# Patient Record
Sex: FEMALE | Race: AMERICAN INDIAN OR ALASKA NATIVE | NOT HISPANIC OR LATINO | Employment: PART TIME | URBAN - NONMETROPOLITAN AREA
[De-identification: names, ages, dates, MRNs, and addresses within clinical notes are randomized per-mention and may not be internally consistent; named-entity substitution may affect disease eponyms.]

---

## 2018-04-20 ENCOUNTER — HOSPITAL ENCOUNTER (EMERGENCY)
Facility: OTHER | Age: 28
Discharge: HOME | End: 2018-04-21
Attending: FAMILY MEDICINE | Admitting: FAMILY MEDICINE
Payer: COMMERCIAL

## 2018-04-20 ENCOUNTER — TRANSFERRED RECORDS (OUTPATIENT)
Dept: HEALTH INFORMATION MANAGEMENT | Facility: OTHER | Age: 28
End: 2018-04-20

## 2018-04-20 ENCOUNTER — APPOINTMENT (OUTPATIENT)
Dept: ULTRASOUND IMAGING | Facility: OTHER | Age: 28
End: 2018-04-20
Attending: FAMILY MEDICINE
Payer: COMMERCIAL

## 2018-04-20 DIAGNOSIS — Z34.92: Primary | ICD-10-CM

## 2018-04-20 PROCEDURE — 99284 EMERGENCY DEPT VISIT MOD MDM: CPT | Mod: 25 | Performed by: FAMILY MEDICINE

## 2018-04-20 PROCEDURE — 96360 HYDRATION IV INFUSION INIT: CPT | Performed by: FAMILY MEDICINE

## 2018-04-20 PROCEDURE — 76805 OB US >/= 14 WKS SNGL FETUS: CPT | Mod: TC

## 2018-04-20 PROCEDURE — 25000128 H RX IP 250 OP 636: Performed by: FAMILY MEDICINE

## 2018-04-20 PROCEDURE — 84702 CHORIONIC GONADOTROPIN TEST: CPT | Performed by: FAMILY MEDICINE

## 2018-04-20 PROCEDURE — 80053 COMPREHEN METABOLIC PANEL: CPT | Performed by: FAMILY MEDICINE

## 2018-04-20 PROCEDURE — 85025 COMPLETE CBC W/AUTO DIFF WBC: CPT | Performed by: FAMILY MEDICINE

## 2018-04-20 PROCEDURE — 36415 COLL VENOUS BLD VENIPUNCTURE: CPT | Performed by: FAMILY MEDICINE

## 2018-04-20 PROCEDURE — 99283 EMERGENCY DEPT VISIT LOW MDM: CPT | Mod: Z6 | Performed by: FAMILY MEDICINE

## 2018-04-20 PROCEDURE — 96361 HYDRATE IV INFUSION ADD-ON: CPT | Performed by: FAMILY MEDICINE

## 2018-04-20 RX ORDER — CYCLOBENZAPRINE HCL 10 MG
10 TABLET ORAL
COMMUNITY
Start: 2015-03-30 | End: 2018-04-20

## 2018-04-20 RX ORDER — SODIUM CHLORIDE 9 MG/ML
1000 INJECTION, SOLUTION INTRAVENOUS CONTINUOUS
Status: DISCONTINUED | OUTPATIENT
Start: 2018-04-20 | End: 2018-04-21 | Stop reason: HOSPADM

## 2018-04-20 RX ORDER — PNV NO.95/FERROUS FUM/FOLIC AC 28MG-0.8MG
TABLET ORAL
COMMUNITY
Start: 2018-02-07 | End: 2019-01-04

## 2018-04-20 RX ADMIN — SODIUM CHLORIDE 1000 ML: 900 INJECTION, SOLUTION INTRAVENOUS at 23:05

## 2018-04-20 NOTE — ED AVS SNAPSHOT
[facility]    [address]    [place] [address]    Phone:  [phone]    Fax:  [phone]                                       Michela He   MRN: [id number]    Department:  [facility]   Date of Visit:  4/20/2018           After Visit Summary Signature Page     I have received my discharge instructions, and my questions have been answered. I have discussed any challenges I see with this plan with the nurse or doctor.    ..........................................................................................................................................  Patient/Patient Representative Signature      ..........................................................................................................................................  Patient Representative Print Name and Relationship to Patient    ..................................................               ................................................  Date                                            Time    ..........................................................................................................................................  Reviewed by Signature/Title    ...................................................              ..............................................  Date                                                            Time

## 2018-04-20 NOTE — ED AVS SNAPSHOT
[facility]    [address]    [place] [address]    Phone:  [phone]    Fax:  [phone]                                       Michela eH   MRN: [id number]    Department:  [facility]   Date of Visit:  4/20/2018           Patient Information     Date Of Birth          1990        Your diagnoses for this visit were:     Viable pregnancy in second trimester        You were seen by Fabián Hernández MD.      24 Hour Appointment Hotline       To make an appointment at any [facility], call [phone] ([phone]). If you don't have a family doctor or clinic, we will help you find one. [facility] clinics are conveniently located to serve the needs of you and your family.             Review of your medicines      Our records show that you are taking the medicines listed below. If these are incorrect, please call your family doctor or clinic.        Dose / Directions Last dose taken    Prenatal Vitamin 27-0.8 MG Tabs        Refills:  0        TYLENOL 325 MG Caps   Dose:  650 mg   Generic drug:  Acetaminophen        Take 650 mg by mouth   Refills:  0                Procedures and tests performed during your visit     CBC with platelets differential    Comprehensive metabolic panel    HCG quantitative pregnancy    US OB > 14 Weeks Complete Single      Orders Needing Specimen Collection     None      Pending Results     Date and Time Order Name Status Description    4/20/2018 2300 HCG quantitative pregnancy In process     4/20/2018 2300 Comprehensive metabolic panel In process             Pending Culture Results     No orders found for last 3 day(s).            Pending Results Instructions     If you had any lab results that were not finalized at the time of your Discharge, you can call the ED Lab Result RN at [phone]. You will be contacted by this team for any positive Lab results or changes in treatment. The nurses are available 7 days a  "week from 10A to 6:30P.  You can leave a message 24 hours per day and they will return your call.        Thank you for choosing [facility]       Thank you for choosing [facility] for your care. Our goal is always to provide you with excellent care. Hearing back from our patients is one way we can continue to improve our services. Please take a few minutes to complete the written survey that you may receive in the mail after you visit with us. Thank you!        powervaultharAppSheet Information     AppSheet lets you send messages to your doctor, view your test results, renew your prescriptions, schedule appointments and more. To sign up, go to [email or web address] . Click on \"Log in\" on the left side of the screen, which will take you to the Welcome page. Then click on \"Sign up Now\" on the right side of the page.     You will be asked to enter the access code listed below, as well as some personal information. Please follow the directions to create your username and password.     Your access code is: [email or web address]  Expires: 2018  1:04 AM     Your access code will  in 90 days. If you need help or a new code, please call your [facility] clinic or [phone].        Care EveryWhere ID     This is your Care EveryWhere ID. This could be used by other organizations to access your [facility] medical records  [id number]        Equal Access to Services     PATO RUIZ : Hadii katey Go, waaxda luqadaha, qaybta kaalmada nazia, ivan olguin. So [facility] [phone].    ATENCIÓN: Si habla español, tiene a evangelista disposición servicios gratuitos de asistencia lingüística. Llame al [phone].    We comply with applicable federal civil rights laws and [place] laws. We do not discriminate on the basis of race, color, national origin, age, disability, sex, sexual orientation, or gender identity.            After Visit Summary       This is your record. Keep this with you and show to your " community pharmacist(s) and doctor(s) at your next visit.

## 2018-04-21 VITALS
OXYGEN SATURATION: 98 % | DIASTOLIC BLOOD PRESSURE: 59 MMHG | HEIGHT: 67 IN | TEMPERATURE: 98.8 F | SYSTOLIC BLOOD PRESSURE: 101 MMHG | RESPIRATION RATE: 16 BRPM | HEART RATE: 102 BPM | BODY MASS INDEX: 25.11 KG/M2 | WEIGHT: 160 LBS

## 2018-04-21 LAB
ALBUMIN SERPL-MCNC: 3.5 G/DL (ref 3.5–5.7)
ALP SERPL-CCNC: 54 U/L (ref 34–104)
ALT SERPL W P-5'-P-CCNC: 13 U/L (ref 7–52)
ANION GAP SERPL CALCULATED.3IONS-SCNC: 8 MMOL/L (ref 3–14)
AST SERPL W P-5'-P-CCNC: 13 U/L (ref 13–39)
B-HCG SERPL-ACNC: NORMAL IU/L
BASOPHILS # BLD AUTO: 0 10E9/L (ref 0–0.2)
BASOPHILS NFR BLD AUTO: 0.2 %
BILIRUB SERPL-MCNC: 0.2 MG/DL (ref 0.3–1)
BUN SERPL-MCNC: 6 MG/DL (ref 7–25)
CALCIUM SERPL-MCNC: 8.4 MG/DL (ref 8.6–10.3)
CHLORIDE SERPL-SCNC: 106 MMOL/L (ref 98–107)
CO2 SERPL-SCNC: 21 MMOL/L (ref 21–31)
CREAT SERPL-MCNC: 0.39 MG/DL (ref 0.6–1.2)
DIFFERENTIAL METHOD BLD: NORMAL
EOSINOPHIL # BLD AUTO: 0 10E9/L (ref 0–0.7)
EOSINOPHIL NFR BLD AUTO: 0.2 %
ERYTHROCYTE [DISTWIDTH] IN BLOOD BY AUTOMATED COUNT: 14 % (ref 10–15)
GFR SERPL CREATININE-BSD FRML MDRD: >90 ML/MIN/1.7M2
GLUCOSE SERPL-MCNC: 99 MG/DL (ref 70–105)
HCT VFR BLD AUTO: 39.1 % (ref 35–47)
HGB BLD-MCNC: 13.4 G/DL (ref 11.7–15.7)
IMM GRANULOCYTES # BLD: 0 10E9/L (ref 0–0.4)
IMM GRANULOCYTES NFR BLD: 0.3 %
LYMPHOCYTES # BLD AUTO: 1.3 10E9/L (ref 0.8–5.3)
LYMPHOCYTES NFR BLD AUTO: 13.8 %
MCH RBC QN AUTO: 29.8 PG (ref 26.5–33)
MCHC RBC AUTO-ENTMCNC: 34.3 G/DL (ref 31.5–36.5)
MCV RBC AUTO: 87 FL (ref 78–100)
MONOCYTES # BLD AUTO: 0.6 10E9/L (ref 0–1.3)
MONOCYTES NFR BLD AUTO: 6.1 %
NEUTROPHILS # BLD AUTO: 7.7 10E9/L (ref 1.6–8.3)
NEUTROPHILS NFR BLD AUTO: 79.4 %
PLATELET # BLD AUTO: 170 10E9/L (ref 150–450)
POTASSIUM SERPL-SCNC: 3.3 MMOL/L (ref 3.5–5.1)
PROT SERPL-MCNC: 6.2 G/DL (ref 6.4–8.9)
RBC # BLD AUTO: 4.49 10E12/L (ref 3.8–5.2)
SODIUM SERPL-SCNC: 135 MMOL/L (ref 134–144)
WBC # BLD AUTO: 9.7 10E9/L (ref 4–11)

## 2018-04-21 PROCEDURE — 25000128 H RX IP 250 OP 636: Performed by: FAMILY MEDICINE

## 2018-04-21 RX ADMIN — SODIUM CHLORIDE 1000 ML: 900 INJECTION, SOLUTION INTRAVENOUS at 00:17

## 2018-04-21 NOTE — ED PROVIDER NOTES
"  History   No chief complaint on file.        Pt admit to [facility] via [facility] ambulance.  Pt was to have birth control implants placed (in January) and found out she's pregnant.  She hasn't had an U/S yet and isn't sure of her due date, isn't sure when her last period was (\"around Claire time I think\")   Pt reports cramping, vomiting, pain that radiates all the way around to her back, has some vaginal discharge (has a hx of bacterial infections and thinks she may be getting one again), had a sudden onset of cramping that started at 2115 today, constipation X 4 days but had a \"few\" BM's today.  [facility] unable to find fetal heart tones with doppler.  Pt brought to [facility] from [facility] for U/S.                HPI  Michela He is a 28 year old female who resents the emergency department from [facility].  I discussed with the ER doctor there he felt she needed an ultrasound to rule out ectopic.  She has no vaginal bleeding.  She has unclear dates of her pregnancy.  I agreed she likely needed an ultrasound.  She was transported from [facility] to [facility].    Problem List:    There are no active problems to display for this patient.       Past Medical History:    Past Medical History:   Diagnosis Date     Benign neoplasm of cerebral meninges (H)      Irregular menstrual cycle      LGSIL Pap smear of vagina        Past Surgical History:    Past Surgical History:   Procedure Laterality Date     EXTRACTION(S) DENTAL      No Comments Provided     OTHER SURGICAL HISTORY      8/2011,95592,NERVOUS SYSTEM SURGERY,Removal of meningioma       Family History:    Family History   Problem Relation Age of Onset     Hypertension Mother      Hypertension     DIABETES Other      Diabetes,FH     HEART DISEASE Other      Heart Disease,FH       Social History:  Marital Status:  Single [1]  Social History   Substance Use Topics     Smoking status: Current Every Day Smoker     Packs/day: 0.25     Types: " "Cigarettes     Start date: 1/1/2007     Smokeless tobacco: Never Used     Alcohol use No        Medications:      Acetaminophen (TYLENOL) 325 MG CAPS   Prenatal Vit-Fe Fumarate-FA (PRENATAL VITAMIN) 27-0.8 MG TABS         Review of Systems   Constitutional: Negative for chills and fever.   HENT: Negative for congestion.    Eyes: Negative for photophobia.   Respiratory: Negative for choking and stridor.    Cardiovascular: Negative for chest pain.   Endocrine: Negative for polydipsia, polyphagia and polyuria.   Genitourinary: Negative for dysuria.   Neurological: Negative for seizures and speech difficulty.       Physical Exam   BP: (!) 124/94  Pulse: 102  Temp: 98.8  F (37.1  C)  Resp: 16  Height: 170.2 cm (5' 7\")  Weight: 72.6 kg (160 lb)  SpO2: 96 %      Physical Exam   Cardiovascular: Normal rate.    No murmur heard.  Pulmonary/Chest: Effort normal and breath sounds normal. No respiratory distress. She has no wheezes. She has no rales.   Abdominal: Soft. Bowel sounds are normal.   Nursing note and vitals reviewed.      ED Course     ED Course     Procedures             Results for orders placed or performed during the hospital encounter of 04/20/18 (from the past 24 hour(s))   US OB > 14 Weeks Complete Single    Narrative    EXAM:  US Pregnant Uterus, Limited    CLINICAL HISTORY:  28 years old, female; Signs and symptoms; Lmp or gestational   age (in weeks): Unknown lmp; Antepartum complications; Other: Abd pain,   vomiting; Pregnant; Additional info: R/O ectopic and torsion    TECHNIQUE:  Real-time ultrasound of the pregnant maternal uterus (limited) with   image documentation.    COMPARISON:  No relevant prior studies available.    FINDINGS:    Fetus:  Single viable uterine pregnancy.    Gestational age:  Estimated gestational age by ultrasound is 16 weeks 1 day.    EFW:  Estimated fetal weight is 144 g.    Position:  Previous anterior wall position.    Heart rate:  Cardiac activity is seen with a rate of 150 " beats per minute.    Placenta:  Placenta is low lying, 1.2 cm from the cervical os.    Amniotic fluid:  MALLORY is 13.5 cm which is normal.    Cervix:  Cervix is 3.1 cm in length and closed.    Adnexa:  Corpus luteum cyst left ovary.      Impression    IMPRESSION:         Single viable uterine pregnancy.  No acute findings.    THIS DOCUMENT HAS BEEN ELECTRONICALLY SIGNED BY HELADIO JARA MD       Medications   0.9% sodium chloride BOLUS (1,000 mLs Intravenous New Bag 4/20/18 7249)     Followed by   sodium chloride 0.9% infusion (1,000 mLs Intravenous New Bag 4/21/18 0017)       Assessments & Plan (with Medical Decision Making)       New Prescriptions    No medications on file       Final diagnoses:   Viable pregnancy in second trimester     Patient stable and fine here in the emergency department.  Viable pregnancy on ultrasound, recommend establish early OB care.  Return to the ER with vaginal bleeding nausea vomiting or diarrhea, or fevers.  Patient verbalized understanding of plan of care and is in agreement with plan.  4/20/2018   [facility]     Fabián Hernández MD  04/25/18 0990

## 2018-04-21 NOTE — ED TRIAGE NOTES
"Pt admit to [facility] via [facility] ambulance.  Pt was to have birth control implants placed (in January) and found out she's pregnant.  She hasn't had an U/S yet and isn't sure of her due date, isn't sure when her last period was (\"around Claire time I think\")   Pt reports cramping, vomiting, pain that radiates all the way around to her back, has some vaginal discharge (has a hx of bacterial infections and thinks she may be getting one again), had a sudden onset of cramping that started at 2115 today, constipation X 4 days but had a \"few\" BM's today.  [place] [facility] unable to find fetal heart tones with doppler.  Pt brought to [facility] from [facility] for U/S.    "

## 2018-04-25 ASSESSMENT — ENCOUNTER SYMPTOMS
SPEECH DIFFICULTY: 0
STRIDOR: 0
DYSURIA: 0
FEVER: 0
CHILLS: 0
POLYPHAGIA: 0
SEIZURES: 0
POLYDIPSIA: 0
CHOKING: 0
PHOTOPHOBIA: 0

## 2018-10-30 ENCOUNTER — OFFICE VISIT (OUTPATIENT)
Dept: OBGYN | Facility: OTHER | Age: 28
End: 2018-10-30
Attending: OBSTETRICS & GYNECOLOGY
Payer: COMMERCIAL

## 2018-10-30 VITALS
HEART RATE: 68 BPM | DIASTOLIC BLOOD PRESSURE: 60 MMHG | SYSTOLIC BLOOD PRESSURE: 110 MMHG | WEIGHT: 143 LBS | BODY MASS INDEX: 22.4 KG/M2

## 2018-10-30 DIAGNOSIS — Z30.09 STERILIZATION CONSULT: Primary | ICD-10-CM

## 2018-10-30 PROCEDURE — G0463 HOSPITAL OUTPT CLINIC VISIT: HCPCS

## 2018-10-30 PROCEDURE — 99203 OFFICE O/P NEW LOW 30 MIN: CPT | Performed by: OBSTETRICS & GYNECOLOGY

## 2018-10-30 RX ORDER — IBUPROFEN 800 MG/1
1 TABLET, FILM COATED ORAL EVERY 8 HOURS
Refills: 1 | COMMUNITY
Start: 2018-10-07

## 2018-10-30 RX ORDER — HYDROCORTISONE ACETATE 25 MG/1
25 SUPPOSITORY RECTAL PRN
COMMUNITY
Start: 2018-10-07 | End: 2019-01-04

## 2018-10-30 RX ORDER — FERROUS SULFATE 324(65)MG
1 TABLET, DELAYED RELEASE (ENTERIC COATED) ORAL DAILY
Refills: 1 | COMMUNITY
Start: 2018-10-07 | End: 2019-01-04

## 2018-10-30 RX ORDER — DOCUSATE SODIUM 100 MG/1
1 CAPSULE, LIQUID FILLED ORAL 2 TIMES DAILY PRN
Refills: 1 | COMMUNITY
Start: 2018-10-07 | End: 2019-01-04

## 2018-10-30 RX ORDER — ASCORBIC ACID 500 MG
1 TABLET ORAL DAILY
Refills: 2 | COMMUNITY
Start: 2018-10-07 | End: 2019-01-04

## 2018-10-30 ASSESSMENT — PAIN SCALES - GENERAL: PAINLEVEL: NO PAIN (0)

## 2018-10-30 NOTE — MR AVS SNAPSHOT
"              After Visit Summary   10/30/2018    Michela He    MRN: [id number]           Patient Information     Date Of Birth          1990        Visit Information        Provider Department      10/30/2018 1:00 PM Mayito Kincaid MD [facility]        Today's Diagnoses     Sterilization consult    -  1       Follow-ups after your visit        Your next 10 appointments already scheduled     Jan 24, 2019  2:15 PM CST   SHORT with Mayito Kincaid MD   [facility]    [address]  [place] [address] [phone]              Who to contact     If you have questions or need follow up information about today's clinic visit or your schedule please contact [facility] directly at [phone].  Normal or non-critical lab and imaging results will be communicated to you by BeTheBeasthart, letter or phone within 4 business days after the clinic has received the results. If you do not hear from us within 7 days, please contact the clinic through MyChart or phone. If you have a critical or abnormal lab result, we will notify you by phone as soon as possible.  Submit refill requests through Feusd or call your pharmacy and they will forward the refill request to us. Please allow 3 business days for your refill to be completed.          Additional Information About Your Visit        BeTheBeasthart Information     Feusd lets you send messages to your doctor, view your test results, renew your prescriptions, schedule appointments and more. To sign up, go to [email or web address] . Click on \"Log in\" on the left side of the screen, which will take you to the Welcome page. Then click on \"Sign up Now\" on the right side of the page.     You will be asked to enter the access code listed below, as well as some personal information. Please follow the directions to create your username and password.     Your access " code is: [email or web address]  Expires: 2019  3:27 PM     Your access code will  in 90 days. If you need help or a new code, please call your [facility] clinic or [phone].        Care EveryWhere ID     This is your Care EveryWhere ID. This could be used by other organizations to access your [facility] medical records  [id number]        Your Vitals Were     Pulse Breastfeeding? BMI (Body Mass Index)             68 Yes 22.4 kg/m2          Blood Pressure from Last 3 Encounters:   10/30/18 110/60   18 101/59    Weight from Last 3 Encounters:   10/30/18 64.9 kg (143 lb)   18 72.6 kg (160 lb)   10/14/14 65 kg (143 lb 3.2 oz)              Today, you had the following     No orders found for display       Primary Care Provider Office Phone # Fax #    Jenni Fields [phone]       [facility] [address]  [facility] [address]        Equal Access to Services     [facility]: Hadii aad ku hadasho Soomaali, waaxda luqadaha, qaybta kaalmada adeegyada, waxay idiin hayrishin chase gomes . So [facility] [phone].    ATENCIÓN: Si habla español, tiene a evangelista disposición servicios gratuitos de asistencia lingüística. Llame al [phone].    We comply with applicable federal civil rights laws and [place] laws. We do not discriminate on the basis of race, color, national origin, age, disability, sex, sexual orientation, or gender identity.            Thank you!     Thank you for choosing [facility]  for your care. Our goal is always to provide you with excellent care. Hearing back from our patients is one way we can continue to improve our services. Please take a few minutes to complete the written survey that you may receive in the mail after your visit with us. Thank you!             Your Updated Medication List - Protect others around you: Learn how to safely use, store and throw away your medicines at www.disposemymeds.org.          This  list is accurate as of 10/30/18  3:27 PM.  Always use your most recent med list.                   Brand Name Dispense Instructions for use Diagnosis    ascorbic acid 500 MG tablet    VITAMIN C     Take 1 tablet by mouth daily         MG capsule   Generic drug:  docusate sodium      Take 1 capsule by mouth 2 times daily as needed        Ferrous Sulfate 324 (65 Fe) MG Tbec      Take 1 tablet by mouth daily        hydrocortisone 25 MG Suppository    ANUSOL-HC     Place 25 mg rectally as needed        ibuprofen 800 MG tablet    ADVIL/MOTRIN     Take 1 tablet by mouth every 8 hours        Prenatal Vitamin 27-0.8 MG Tabs       Viable pregnancy in second trimester       * TYLENOL 325 MG Caps   Generic drug:  Acetaminophen      Take 650 mg by mouth    Viable pregnancy in second trimester       * TYLENOL 325 MG Caps   Generic drug:  Acetaminophen      Take by mouth as needed        * Notice:  This list has 2 medication(s) that are the same as other medications prescribed for you. Read the directions carefully, and ask your doctor or other care provider to review them with you.

## 2018-10-30 NOTE — NURSING NOTE
Date of Surgery: 1-7-2019  Type of Surgery: Laparoscopic Bilateral Salpingectomy  Surgeon: Dr. DEQUAN Kincaid    Patient's consents were signed and appropriate appointments were scheduled by the [facility] . Patient was given surgical folder which includes pre-operative bathing instructions related to the two packets of Hibiclens surgical prep provided. Surgical forms were faxed to [phone] and [phone], copies made and kept for informative purposes, and originals were delivered to [facility]. Patient denies questions at this time.      Cait Wynn............. 10/30/2018 1:47 PM

## 2018-10-30 NOTE — NURSING NOTE
Chief Complaint   Patient presents with     Consult     Tubal Ligation        Medication Reconciliation: completed   Randi Olmstead LPN  10/30/2018 1:17 PM

## 2018-10-30 NOTE — LETTER
10/30/2018        RE: Michela He  [address]   [place] [address]        Michela is a very pleasant 28-year-old para 3 who is about 4 weeks postpartum deferred for interval tubal ligation from Dr. Fields and in [facility].  Has 3 children ages 2 and under.  Vaginal deliveries for all three.  Has decided upon permanent sterilization.    Healthy no ongoing medical issues    Past surgical history:  1.  Removal of a meningioma in 2011, no residual issues or restrictions.  2.  Cholecystectomy    Family history hypertension    Tobacco 3-5 cigarettes/day    Medications: Prenatal vitamins    GYN, , GI review of systems otherwise negative    Physical exam:  Limited to vitals  Blood pressure 110/60, pulse 68, weight 143    Assessment:  Desire for interval tubal ligation/sterilization.    Plan:  Laparoscopic tubal and laparoscopic bilateral salpingectomy discussed.  Cancer reduction risk reviewed.  Permanence of procedure and small failure rate reviewed as as well as risks benefits.  Following her discussion the patient would like to consider the laparoscopic salpingectomy.  She would like to wait after the holiday season.  Tubal consent signed.  Given that it will be 2-3 months prior to her surgery will have her see Dr. Fields for her preoperative examination.    30 minutes spent, majority in counseling time      Sincerely,        Mayito Kincaid MD

## 2018-10-30 NOTE — PROGRESS NOTES
Michela is a very pleasant 28-year-old para 3 who is about 4 weeks postpartum deferred for interval tubal ligation from Dr. Fields and in [facility].  Has 3 children ages 2 and under.  Vaginal deliveries for all three.  Has decided upon permanent sterilization.    Healthy no ongoing medical issues    Past surgical history:  1.  Removal of a meningioma in 2011, no residual issues or restrictions.  2.  Cholecystectomy    Family history hypertension    Tobacco 3-5 cigarettes/day    Medications: Prenatal vitamins    GYN, , GI review of systems otherwise negative    Physical exam:  Limited to vitals  Blood pressure 110/60, pulse 68, weight 143    Assessment:  Desire for interval tubal ligation/sterilization.    Plan:  Laparoscopic tubal and laparoscopic bilateral salpingectomy discussed.  Cancer reduction risk reviewed.  Permanence of procedure and small failure rate reviewed as as well as risks benefits.  Following her discussion the patient would like to consider the laparoscopic salpingectomy.  She would like to wait after the holiday season.  Tubal consent signed.  Given that it will be 2-3 months prior to her surgery will have her see Dr. Fields for her preoperative examination.    30 minutes spent, majority in counseling time

## 2019-01-04 ENCOUNTER — ANESTHESIA EVENT (OUTPATIENT)
Dept: SURGERY | Facility: OTHER | Age: 29
End: 2019-01-04
Payer: COMMERCIAL

## 2019-01-07 ENCOUNTER — TRANSFERRED RECORDS (OUTPATIENT)
Dept: SURGERY | Facility: OTHER | Age: 29
End: 2019-01-07

## 2019-01-07 ENCOUNTER — HOSPITAL ENCOUNTER (OUTPATIENT)
Facility: OTHER | Age: 29
Discharge: HOME | End: 2019-01-07
Attending: OBSTETRICS & GYNECOLOGY | Admitting: OBSTETRICS & GYNECOLOGY
Payer: COMMERCIAL

## 2019-01-07 ENCOUNTER — ANESTHESIA (OUTPATIENT)
Dept: SURGERY | Facility: OTHER | Age: 29
End: 2019-01-07
Payer: COMMERCIAL

## 2019-01-07 VITALS
DIASTOLIC BLOOD PRESSURE: 51 MMHG | OXYGEN SATURATION: 98 % | WEIGHT: 140.4 LBS | HEART RATE: 67 BPM | TEMPERATURE: 97.2 F | SYSTOLIC BLOOD PRESSURE: 114 MMHG | HEIGHT: 67 IN | RESPIRATION RATE: 16 BRPM | BODY MASS INDEX: 22.03 KG/M2

## 2019-01-07 DIAGNOSIS — Z98.51 S/P TUBAL LIGATION: Primary | ICD-10-CM

## 2019-01-07 LAB
HCG UR QL: NEGATIVE
HGB BLD-MCNC: 12 G/DL (ref 11.7–15.7)

## 2019-01-07 PROCEDURE — 58661 LAPAROSCOPY REMOVE ADNEXA: CPT | Performed by: NURSE ANESTHETIST, CERTIFIED REGISTERED

## 2019-01-07 PROCEDURE — 25000125 ZZHC RX 250: Performed by: NURSE ANESTHETIST, CERTIFIED REGISTERED

## 2019-01-07 PROCEDURE — 81025 URINE PREGNANCY TEST: CPT | Performed by: OBSTETRICS & GYNECOLOGY

## 2019-01-07 PROCEDURE — 40000306 ZZH STATISTIC PRE PROC ASSESS II: Performed by: OBSTETRICS & GYNECOLOGY

## 2019-01-07 PROCEDURE — 36415 COLL VENOUS BLD VENIPUNCTURE: CPT | Performed by: OBSTETRICS & GYNECOLOGY

## 2019-01-07 PROCEDURE — 25000564 ZZH DESFLURANE, EA 15 MIN: Performed by: OBSTETRICS & GYNECOLOGY

## 2019-01-07 PROCEDURE — 88302 TISSUE EXAM BY PATHOLOGIST: CPT | Performed by: OBSTETRICS & GYNECOLOGY

## 2019-01-07 PROCEDURE — 27211024 ZZHC OR SUPPLY OTHER OPNP: Performed by: OBSTETRICS & GYNECOLOGY

## 2019-01-07 PROCEDURE — 37000008 ZZH ANESTHESIA TECHNICAL FEE, 1ST 30 MIN: Performed by: OBSTETRICS & GYNECOLOGY

## 2019-01-07 PROCEDURE — 25000128 H RX IP 250 OP 636: Performed by: NURSE ANESTHETIST, CERTIFIED REGISTERED

## 2019-01-07 PROCEDURE — 25000128 H RX IP 250 OP 636: Performed by: OBSTETRICS & GYNECOLOGY

## 2019-01-07 PROCEDURE — 85018 HEMOGLOBIN: CPT | Performed by: OBSTETRICS & GYNECOLOGY

## 2019-01-07 PROCEDURE — 36000056 ZZH SURGERY LEVEL 3 1ST 30 MIN: Performed by: OBSTETRICS & GYNECOLOGY

## 2019-01-07 PROCEDURE — 27210794 ZZH OR GENERAL SUPPLY STERILE: Performed by: OBSTETRICS & GYNECOLOGY

## 2019-01-07 PROCEDURE — 58661 LAPAROSCOPY REMOVE ADNEXA: CPT | Performed by: OBSTETRICS & GYNECOLOGY

## 2019-01-07 PROCEDURE — 25000132 ZZH RX MED GY IP 250 OP 250 PS 637: Performed by: OBSTETRICS & GYNECOLOGY

## 2019-01-07 PROCEDURE — 71000027 ZZH RECOVERY PHASE 2 EACH 15 MINS: Performed by: OBSTETRICS & GYNECOLOGY

## 2019-01-07 PROCEDURE — 71000014 ZZH RECOVERY PHASE 1 LEVEL 2 FIRST HR: Performed by: OBSTETRICS & GYNECOLOGY

## 2019-01-07 PROCEDURE — 36000058 ZZH SURGERY LEVEL 3 EA 15 ADDTL MIN: Performed by: OBSTETRICS & GYNECOLOGY

## 2019-01-07 PROCEDURE — 37000009 ZZH ANESTHESIA TECHNICAL FEE, EACH ADDTL 15 MIN: Performed by: OBSTETRICS & GYNECOLOGY

## 2019-01-07 RX ORDER — IBUPROFEN 600 MG/1
600 TABLET, FILM COATED ORAL EVERY 6 HOURS PRN
Qty: 30 TABLET | Refills: 0 | Status: SHIPPED | OUTPATIENT
Start: 2019-01-07 | End: 2019-02-06

## 2019-01-07 RX ORDER — ONDANSETRON 2 MG/ML
4 INJECTION INTRAMUSCULAR; INTRAVENOUS EVERY 30 MIN PRN
Status: DISCONTINUED | OUTPATIENT
Start: 2019-01-07 | End: 2019-01-07 | Stop reason: HOSPADM

## 2019-01-07 RX ORDER — KETOROLAC TROMETHAMINE 30 MG/ML
30 INJECTION, SOLUTION INTRAMUSCULAR; INTRAVENOUS ONCE
Status: COMPLETED | OUTPATIENT
Start: 2019-01-07 | End: 2019-01-07

## 2019-01-07 RX ORDER — CEFAZOLIN SODIUM 2 G/100ML
2 INJECTION, SOLUTION INTRAVENOUS
Status: COMPLETED | OUTPATIENT
Start: 2019-01-07 | End: 2019-01-07

## 2019-01-07 RX ORDER — HYDROCODONE BITARTRATE AND ACETAMINOPHEN 5; 325 MG/1; MG/1
1 TABLET ORAL
Status: COMPLETED | OUTPATIENT
Start: 2019-01-07 | End: 2019-01-07

## 2019-01-07 RX ORDER — DEXAMETHASONE SODIUM PHOSPHATE 4 MG/ML
INJECTION, SOLUTION INTRA-ARTICULAR; INTRALESIONAL; INTRAMUSCULAR; INTRAVENOUS; SOFT TISSUE PRN
Status: DISCONTINUED | OUTPATIENT
Start: 2019-01-07 | End: 2019-01-07

## 2019-01-07 RX ORDER — BUPIVACAINE HYDROCHLORIDE AND EPINEPHRINE 5; 5 MG/ML; UG/ML
INJECTION, SOLUTION EPIDURAL; INTRACAUDAL; PERINEURAL PRN
Status: DISCONTINUED | OUTPATIENT
Start: 2019-01-07 | End: 2019-01-07 | Stop reason: HOSPADM

## 2019-01-07 RX ORDER — BUPIVACAINE HYDROCHLORIDE 2.5 MG/ML
INJECTION, SOLUTION INFILTRATION; PERINEURAL PRN
Status: DISCONTINUED | OUTPATIENT
Start: 2019-01-07 | End: 2019-01-07 | Stop reason: HOSPADM

## 2019-01-07 RX ORDER — ONDANSETRON 4 MG/1
4 TABLET, ORALLY DISINTEGRATING ORAL EVERY 30 MIN PRN
Status: DISCONTINUED | OUTPATIENT
Start: 2019-01-07 | End: 2019-01-07 | Stop reason: HOSPADM

## 2019-01-07 RX ORDER — SODIUM CHLORIDE, SODIUM LACTATE, POTASSIUM CHLORIDE, CALCIUM CHLORIDE 600; 310; 30; 20 MG/100ML; MG/100ML; MG/100ML; MG/100ML
INJECTION, SOLUTION INTRAVENOUS CONTINUOUS
Status: DISCONTINUED | OUTPATIENT
Start: 2019-01-07 | End: 2019-01-07 | Stop reason: HOSPADM

## 2019-01-07 RX ORDER — PROPOFOL 10 MG/ML
INJECTION, EMULSION INTRAVENOUS PRN
Status: DISCONTINUED | OUTPATIENT
Start: 2019-01-07 | End: 2019-01-07

## 2019-01-07 RX ORDER — ONDANSETRON 4 MG/1
4 TABLET, ORALLY DISINTEGRATING ORAL
Status: DISCONTINUED | OUTPATIENT
Start: 2019-01-07 | End: 2019-01-07 | Stop reason: HOSPADM

## 2019-01-07 RX ORDER — FENTANYL CITRATE 50 UG/ML
25-50 INJECTION, SOLUTION INTRAMUSCULAR; INTRAVENOUS
Status: DISCONTINUED | OUTPATIENT
Start: 2019-01-07 | End: 2019-01-07 | Stop reason: HOSPADM

## 2019-01-07 RX ORDER — HYDROCODONE BITARTRATE AND ACETAMINOPHEN 5; 325 MG/1; MG/1
1-2 TABLET ORAL EVERY 4 HOURS PRN
Qty: 8 TABLET | Refills: 0 | Status: SHIPPED | OUTPATIENT
Start: 2019-01-07 | End: 2019-01-10

## 2019-01-07 RX ORDER — LIDOCAINE HYDROCHLORIDE 20 MG/ML
INJECTION, SOLUTION INFILTRATION; PERINEURAL PRN
Status: DISCONTINUED | OUTPATIENT
Start: 2019-01-07 | End: 2019-01-07

## 2019-01-07 RX ORDER — HYDROMORPHONE HYDROCHLORIDE 1 MG/ML
.3-.5 INJECTION, SOLUTION INTRAMUSCULAR; INTRAVENOUS; SUBCUTANEOUS EVERY 10 MIN PRN
Status: DISCONTINUED | OUTPATIENT
Start: 2019-01-07 | End: 2019-01-07 | Stop reason: HOSPADM

## 2019-01-07 RX ORDER — ONDANSETRON 2 MG/ML
INJECTION INTRAMUSCULAR; INTRAVENOUS PRN
Status: DISCONTINUED | OUTPATIENT
Start: 2019-01-07 | End: 2019-01-07

## 2019-01-07 RX ORDER — CEFAZOLIN SODIUM 1 G/50ML
1 INJECTION, SOLUTION INTRAVENOUS SEE ADMIN INSTRUCTIONS
Status: DISCONTINUED | OUTPATIENT
Start: 2019-01-07 | End: 2019-01-07 | Stop reason: HOSPADM

## 2019-01-07 RX ORDER — MEPERIDINE HYDROCHLORIDE 50 MG/ML
12.5 INJECTION INTRAMUSCULAR; INTRAVENOUS; SUBCUTANEOUS
Status: DISCONTINUED | OUTPATIENT
Start: 2019-01-07 | End: 2019-01-07 | Stop reason: HOSPADM

## 2019-01-07 RX ORDER — SCOLOPAMINE TRANSDERMAL SYSTEM 1 MG/1
1 PATCH, EXTENDED RELEASE TRANSDERMAL
Status: DISCONTINUED | OUTPATIENT
Start: 2019-01-07 | End: 2019-01-07 | Stop reason: HOSPADM

## 2019-01-07 RX ORDER — NALOXONE HYDROCHLORIDE 0.4 MG/ML
.1-.4 INJECTION, SOLUTION INTRAMUSCULAR; INTRAVENOUS; SUBCUTANEOUS
Status: DISCONTINUED | OUTPATIENT
Start: 2019-01-07 | End: 2019-01-07 | Stop reason: HOSPADM

## 2019-01-07 RX ORDER — LIDOCAINE 40 MG/G
CREAM TOPICAL
Status: DISCONTINUED | OUTPATIENT
Start: 2019-01-07 | End: 2019-01-07 | Stop reason: HOSPADM

## 2019-01-07 RX ORDER — FENTANYL CITRATE 50 UG/ML
INJECTION, SOLUTION INTRAMUSCULAR; INTRAVENOUS PRN
Status: DISCONTINUED | OUTPATIENT
Start: 2019-01-07 | End: 2019-01-07

## 2019-01-07 RX ORDER — IBUPROFEN 200 MG
600 TABLET ORAL
Status: COMPLETED | OUTPATIENT
Start: 2019-01-07 | End: 2019-01-07

## 2019-01-07 RX ADMIN — KETOROLAC TROMETHAMINE 30 MG: 30 INJECTION, SOLUTION INTRAMUSCULAR at 08:50

## 2019-01-07 RX ADMIN — DEXAMETHASONE SODIUM PHOSPHATE 4 MG: 4 INJECTION, SOLUTION INTRA-ARTICULAR; INTRALESIONAL; INTRAMUSCULAR; INTRAVENOUS; SOFT TISSUE at 09:23

## 2019-01-07 RX ADMIN — FENTANYL CITRATE 50 MCG: 50 INJECTION, SOLUTION INTRAMUSCULAR; INTRAVENOUS at 10:15

## 2019-01-07 RX ADMIN — SUGAMMADEX 250 MG: 100 INJECTION, SOLUTION INTRAVENOUS at 09:58

## 2019-01-07 RX ADMIN — ROCURONIUM BROMIDE 35 MG: 10 INJECTION INTRAVENOUS at 09:23

## 2019-01-07 RX ADMIN — SUCCINYLCHOLINE CHLORIDE 140 MG: 20 INJECTION, SOLUTION INTRAMUSCULAR; INTRAVENOUS; PARENTERAL at 09:15

## 2019-01-07 RX ADMIN — SCOPALAMINE 1 PATCH: 1 PATCH, EXTENDED RELEASE TRANSDERMAL at 08:48

## 2019-01-07 RX ADMIN — LIDOCAINE HYDROCHLORIDE 60 MG: 20 INJECTION, SOLUTION INFILTRATION; PERINEURAL at 09:15

## 2019-01-07 RX ADMIN — FENTANYL CITRATE 50 MCG: 50 INJECTION, SOLUTION INTRAMUSCULAR; INTRAVENOUS at 09:31

## 2019-01-07 RX ADMIN — HYDROCODONE BITARTRATE AND ACETAMINOPHEN 1 TABLET: 5; 325 TABLET ORAL at 10:45

## 2019-01-07 RX ADMIN — CEFAZOLIN SODIUM 2 G: 2 INJECTION, SOLUTION INTRAVENOUS at 09:13

## 2019-01-07 RX ADMIN — FENTANYL CITRATE 50 MCG: 50 INJECTION, SOLUTION INTRAMUSCULAR; INTRAVENOUS at 10:21

## 2019-01-07 RX ADMIN — PROPOFOL 160 MG: 10 INJECTION, EMULSION INTRAVENOUS at 09:15

## 2019-01-07 RX ADMIN — FENTANYL CITRATE 50 MCG: 50 INJECTION, SOLUTION INTRAMUSCULAR; INTRAVENOUS at 09:13

## 2019-01-07 RX ADMIN — MIDAZOLAM 2 MG: 1 INJECTION INTRAMUSCULAR; INTRAVENOUS at 09:13

## 2019-01-07 RX ADMIN — HYDROMORPHONE HYDROCHLORIDE 0.5 MG: 1 INJECTION, SOLUTION INTRAMUSCULAR; INTRAVENOUS; SUBCUTANEOUS at 12:08

## 2019-01-07 RX ADMIN — ONDANSETRON 4 MG: 2 INJECTION INTRAMUSCULAR; INTRAVENOUS at 09:13

## 2019-01-07 RX ADMIN — SODIUM CHLORIDE, SODIUM LACTATE, POTASSIUM CHLORIDE, AND CALCIUM CHLORIDE: 600; 310; 30; 20 INJECTION, SOLUTION INTRAVENOUS at 08:43

## 2019-01-07 RX ADMIN — ROCURONIUM BROMIDE 5 MG: 10 INJECTION INTRAVENOUS at 09:15

## 2019-01-07 RX ADMIN — IBUPROFEN 600 MG: 200 TABLET, FILM COATED ORAL at 12:03

## 2019-01-07 ASSESSMENT — LIFESTYLE VARIABLES: TOBACCO_USE: 1

## 2019-01-07 ASSESSMENT — MIFFLIN-ST. JEOR: SCORE: 1399.48

## 2019-01-07 NOTE — INTERVAL H&P NOTE
The History and Physical is reviewed from 1/2/2019 from [facility].  No changes or additions.  Chest: CTA  CV: RRR  Surgery reviewed in detail.  Post procedure instructions given  All questions answered

## 2019-01-07 NOTE — OP NOTE
Preoperative Diagnosis: Desire for permanent sterilization, cancer risk reduction  Postoperative Diagnosis: Same  Procedure: Lap scopic bilateral salpingectomy  Surgeon: Mayito Kincaid MD  Assistant Surgeon: None    Clinical History: 28-year-old para 3, 3 months postpartum, desires permanent sterilization.  Options reviewed.  The above procedure decided upon.    Findings: Liver and stomach normal.  Upper abdomen otherwise negative.  Pelvis unremarkable with normal sized uterus and bilateral normal ovaries.  No endometriosis or adhesions denoted.    Operative Report: Patient was taken to the OR, received general anesthetic, and was prepped and draped in the usual fashion.  Timeout performed.  SCDs and perioperative antibiotics given.  A uterine manipulator was placed.  Through her previous supraumbilical incision a varies needle was introduced into the abdominal cavity tested and the abdomen insufflated to normal pressures.  A 5 mm trocar and sleeve were placed, the laparoscope was placed, and there was no evidence of bleeding or trauma.  The upper abdomen was explored.  The patient was then placed in Trendelenburg.  The pelvis was explored.  2 auxiliary ports were placed, a 5 mm in the left lower quadrant and an 8 mm in the right lower quadrant.  The right tube was then elevated and the mesosalpinx was cauterized and divided with the LigaSure beginning distally and proceeding proximally.  The proximal tube was then cauterized and divided and the specimen removed.  Similarly the left tube was removed in similar fashion.  Both ovaries did not have there vasculature compromised.  Hemostasis was excellent.  Pneumoperitoneum was released.  The laparoscopic sleeves were removed.  The skin incisions were closed with 3-0 Monocryl followed by the placement of Dermabond.    Patient tolerated the procedure well and was taken to recovery in stable condition:    EBL: < 10 ml    Complications: none apparent

## 2019-01-07 NOTE — OR NURSING
PACU Transfer Note    Michela He was transferred to [facility] via cart.  Equipment used for transport:  none.  Accompanied by:  RN.  Report to [place]    PACU Respiratory Event Documentation     1) Episodes of Apnea greater than or equal to 10 seconds: no    2) Bradypnea - less than 8 breaths per minute: no    3) Pain score on 0 to 10 scale: 5    4) Pain-sedation mismatch (yes or no): no    5) Repeated 02 desaturation less than 90% (yes or no): no    Anesthesia notified? (yes or no): no    Any of the above events occuring repeatedly in separate 30 minute intervals may be considered recurrent PACU respiratory events.    Patient stable and meets phase 1 discharge criteria for transport from PACU.

## 2019-01-07 NOTE — ANESTHESIA CARE TRANSFER NOTE
Patient: Michela He    Procedure(s):  LAPAROSCOPIC SALPINGECTOMY    Diagnosis: tubal sterilization  Diagnosis Additional Information: No value filed.    Anesthesia Type:   General, ETT     Note:  Airway :Face Mask  Patient transferred to:PACU  Handoff Report: Identifed the Patient, Identified the Reponsible Provider, Reviewed the pertinent medical history, Discussed the surgical course, Reviewed Intra-OP anesthesia mangement and issues during anesthesia, Set expectations for post-procedure period and Allowed opportunity for questions and acknowledgement of understanding      Vitals: (Last set prior to Anesthesia Care Transfer)    CRNA VITALS  1/7/2019 0935 - 1/7/2019 1007      1/7/2019             Pulse:  114    Ht Rate:  114    SpO2:  100 %    Resp Rate (set):  10                Electronically Signed By: EDWAR Kim CRNA  January 7, 2019  10:07 AM

## 2019-01-07 NOTE — BRIEF OP NOTE
[facility]    Brief Operative Note    Pre-operative diagnosis: tubal sterilization  Post-operative diagnosis sterilization, cancer risk reduction  Procedure: Procedure(s):  LAPAROSCOPIC SALPINGECTOMY  Surgeon: Surgeon(s) and Role:     * Mayito Kincaid MD - Primary  Anesthesia: General   Estimated blood loss: Less than 10 ml  Drains: None  Specimens:   ID Type Source Tests Collected by Time Destination   A : right fallopian tube Other (specify in comments) Fallopian Tube, Right SURGICAL PATHOLOGY EXAM Mayito Kincaid MD 1/7/2019  9:05 AM    B : left Fallopian Tube Other (specify in comments) Fallopian Tube, Left SURGICAL PATHOLOGY EXAM Mayito Kincaid MD 1/7/2019  9:05 AM      Findings:   None. Normal pelvis and abdomen  Complications: None.  Implants: None.

## 2019-01-07 NOTE — ANESTHESIA POSTPROCEDURE EVALUATION
Patient: Michela He    Procedure(s):  LAPAROSCOPIC SALPINGECTOMY    Diagnosis:tubal sterilization  Diagnosis Additional Information: No value filed.    Anesthesia Type:  General, ETT    Note:  Anesthesia Post Evaluation    Patient location during evaluation: PACU  Patient participation: Able to fully participate in evaluation  Level of consciousness: awake and alert  Pain management: adequate  Airway patency: patent  Cardiovascular status: acceptable  Respiratory status: acceptable  Hydration status: acceptable  PONV: none     Anesthetic complications: None          Last vitals:  Vitals:    01/07/19 1042 01/07/19 1045 01/07/19 1046   BP:  114/81 114/81   Pulse:  69    Resp:   16   Temp: 97.1  F (36.2  C)  97.1  F (36.2  C)   SpO2:  96%          Electronically Signed By: EDWAR Ballard CRNA  January 7, 2019  11:00 AM

## 2019-01-07 NOTE — DISCHARGE INSTRUCTIONS
[facility]   Adult Discharge Orders & Instructions     For 24 hours after surgery    1. Get plenty of rest.  A responsible adult must stay with you for at least 24 hours after you leave the hospital.   2. Do not drive or use heavy equipment.  If you have weakness or tingling, don't drive or use heavy equipment until this feeling goes away.  3. Do not drink alcohol.  4. Avoid strenuous or risky activities.  Ask for help when climbing stairs.   5. You may feel lightheaded.  IF so, sit for a few minutes before standing.  Have someone help you get up.   6. If you have nausea (feel sick to your stomach): Drink only clear liquids such as apple juice, ginger ale, broth or 7-Up.  Rest may also help.  Be sure to drink enough fluids.  Move to a regular diet as you feel able.  7. You may have a slight fever. Call the doctor if your fever is over 101 F (38.3 C) (taken under the tongue) or lasts longer than 24 hours.  8. You may have a dry mouth, a sore throat, muscle aches or trouble sleeping.  These should go away after 24 hours.  9. Do not make important or legal decisions.   Call your doctor for any of the followin.  Signs of infection (fever, growing tenderness at the surgery site, a large amount of drainage or bleeding, severe pain, foul-smelling drainage, redness, swelling).    2. It has been over 8 to 10 hours since surgery and you are still not able to urinate (pass water).    3.  Headache for over 24 hours.    4.  Numbness, tingling or weakness the day after surgery (if you had spinal anesthesia).  To contact a doctor, call    [phone]___________________________[id number] Patch  A Scopalomine Patch was placed behind your left ear.  The patch is used to help with motion sickness or nausea after surgery.  It will last up to 3 days but can be removed sooner if you are experiencing an increase in drossiness, blurred vision, or feel it is no longer needed.  When removing make sure to flush down  the toilet and wash hands immediately.  Keep out of reach of children and pets.

## 2019-01-07 NOTE — ANESTHESIA PREPROCEDURE EVALUATION
Anesthesia Pre-Procedure Evaluation    Patient: Michela He   MRN: [id number] : 1990          Preoperative Diagnosis: tubal sterilization    Procedure(s):  LAPAROSCOPIC SALPINGECTOMY    Past Medical History:   Diagnosis Date     Benign neoplasm of cerebral meninges (H)     2011     Irregular menstrual cycle     ,Started on DepoProvera at [facility]     LGSIL Pap smear of vagina     2011     Past Surgical History:   Procedure Laterality Date     EXTRACTION(S) DENTAL      No Comments Provided     OTHER SURGICAL HISTORY      2011,32300,NERVOUS SYSTEM SURGERY,Removal of meningioma       Anesthesia Evaluation     .             ROS/MED HX    ENT/Pulmonary:     (+)tobacco use, Current use 1/2 packs/day  , . .    Neurologic:  - neg neurologic ROS     Cardiovascular:  - neg cardiovascular ROS       METS/Exercise Tolerance:     Hematologic:  - neg hematologic  ROS       Musculoskeletal:  - neg musculoskeletal ROS       GI/Hepatic:  - neg GI/hepatic ROS       Renal/Genitourinary:  - ROS Renal section negative       Endo:  - neg endo ROS       Psychiatric:  - neg psychiatric ROS       Infectious Disease:  - neg infectious disease ROS       Malignancy:      - no malignancy   Other: Comment: S/p delivery 3 months ago   (+) No chance of pregnancy                         Physical Exam  Normal systems: pulmonary and dental    Airway   Mallampati: I  TM distance: >3 FB  Neck ROM: full    Dental     Cardiovascular   Rhythm and rate: regular and normal      Pulmonary    breath sounds clear to auscultation            Lab Results   Component Value Date    WBC 9.7 2018    HGB 12.0 2019    HCT 39.1 2018     2018     2018    POTASSIUM 3.3 (L) 2018    CHLORIDE 106 2018    CO2 21 2018    BUN 6 (L) 2018    CR 0.39 (L) 2018    GLC 99 2018    JESS 8.4 (L) 2018    ALBUMIN 3.5 2018    PROTTOTAL 6.2 (L) 2018    ALT 13  "04/20/2018    AST 13 04/20/2018    ALKPHOS 54 04/20/2018    BILITOTAL 0.2 (L) 04/20/2018    HCG Negative 01/07/2019       Preop Vitals  BP Readings from Last 3 Encounters:   01/07/19 110/75   10/30/18 110/60   04/21/18 101/59    Pulse Readings from Last 3 Encounters:   10/30/18 68   04/20/18 102   10/14/14 68      Resp Readings from Last 3 Encounters:   01/07/19 16   04/20/18 16    SpO2 Readings from Last 3 Encounters:   01/07/19 99%   04/21/18 98%      Temp Readings from Last 1 Encounters:   01/07/19 98  F (36.7  C) (Tympanic)    Ht Readings from Last 1 Encounters:   01/07/19 1.702 m (5' 7\")      Wt Readings from Last 1 Encounters:   01/07/19 63.7 kg (140 lb 6.4 oz)    Estimated body mass index is 21.99 kg/m  as calculated from the following:    Height as of this encounter: 1.702 m (5' 7\").    Weight as of this encounter: 63.7 kg (140 lb 6.4 oz).       Anesthesia Plan      History & Physical Review      ASA Status:  2 .    NPO Status:  > 8 hours    Plan for General and ETT          Postoperative Care      Consents  Anesthetic plan, risks, benefits and alternatives discussed with:  Patient..                 EDWAR Ballard CRNA  "

## 2021-09-18 ENCOUNTER — ALLIED HEALTH/NURSE VISIT (OUTPATIENT)
Dept: FAMILY MEDICINE | Facility: OTHER | Age: 31
End: 2021-09-18
Attending: NURSE PRACTITIONER
Payer: COMMERCIAL

## 2021-09-18 DIAGNOSIS — R05.9 COUGH: Primary | ICD-10-CM

## 2021-09-18 PROCEDURE — C9803 HOPD COVID-19 SPEC COLLECT: HCPCS

## 2021-09-18 PROCEDURE — U0003 INFECTIOUS AGENT DETECTION BY NUCLEIC ACID (DNA OR RNA); SEVERE ACUTE RESPIRATORY SYNDROME CORONAVIRUS 2 (SARS-COV-2) (CORONAVIRUS DISEASE [COVID-19]), AMPLIFIED PROBE TECHNIQUE, MAKING USE OF HIGH THROUGHPUT TECHNOLOGIES AS DESCRIBED BY CMS-2020-01-R: HCPCS | Mod: ZL

## 2021-09-21 LAB — SARS-COV-2 RNA RESP QL NAA+PROBE: NEGATIVE

## 2021-10-26 ENCOUNTER — OFFICE VISIT (OUTPATIENT)
Dept: FAMILY MEDICINE | Facility: OTHER | Age: 31
End: 2021-10-26
Attending: FAMILY MEDICINE
Payer: COMMERCIAL

## 2021-10-26 VITALS
DIASTOLIC BLOOD PRESSURE: 64 MMHG | HEART RATE: 83 BPM | SYSTOLIC BLOOD PRESSURE: 112 MMHG | OXYGEN SATURATION: 98 % | WEIGHT: 198.8 LBS | TEMPERATURE: 98.1 F | RESPIRATION RATE: 18 BRPM | BODY MASS INDEX: 31.14 KG/M2

## 2021-10-26 DIAGNOSIS — J01.10 ACUTE NON-RECURRENT FRONTAL SINUSITIS: Primary | ICD-10-CM

## 2021-10-26 PROCEDURE — G0463 HOSPITAL OUTPT CLINIC VISIT: HCPCS

## 2021-10-26 PROCEDURE — 99213 OFFICE O/P EST LOW 20 MIN: CPT | Performed by: FAMILY MEDICINE

## 2021-10-26 RX ORDER — MEDROXYPROGESTERONE ACETATE 150 MG/ML
150 INJECTION, SUSPENSION INTRAMUSCULAR
COMMUNITY
Start: 2021-04-14 | End: 2023-11-12

## 2021-10-26 RX ORDER — CITALOPRAM HYDROBROMIDE 10 MG/1
10 TABLET ORAL DAILY
COMMUNITY
End: 2024-03-21

## 2021-10-26 RX ORDER — AZITHROMYCIN 250 MG/1
TABLET, FILM COATED ORAL
Qty: 6 TABLET | Refills: 0 | Status: SHIPPED | OUTPATIENT
Start: 2021-10-26 | End: 2021-10-31

## 2021-10-26 ASSESSMENT — ENCOUNTER SYMPTOMS
SHORTNESS OF BREATH: 0
SINUS PRESSURE: 1
RHINORRHEA: 1
FATIGUE: 0
FEVER: 0
SINUS PAIN: 1
SORE THROAT: 1
COUGH: 1

## 2021-10-26 ASSESSMENT — PATIENT HEALTH QUESTIONNAIRE - PHQ9: SUM OF ALL RESPONSES TO PHQ QUESTIONS 1-9: 17

## 2021-10-26 ASSESSMENT — PAIN SCALES - GENERAL: PAINLEVEL: SEVERE PAIN (6)

## 2021-10-26 NOTE — NURSING NOTE
"Patient here for a cough over a month now. Cough is productive with bright green mucus. She has not noticed any other symptoms   Randi Angel LPN ..........10/26/2021 1:51 PM   Chief Complaint   Patient presents with     Cough     x 1 month       Initial /64 (BP Location: Right arm, Patient Position: Sitting, Cuff Size: Adult Regular)   Pulse 83   Temp 98.1  F (36.7  C) (Tympanic)   Resp 18   Wt 90.2 kg (198 lb 12.8 oz)   LMP  (LMP Unknown)   SpO2 98%   BMI 31.14 kg/m   Estimated body mass index is 31.14 kg/m  as calculated from the following:    Height as of 1/7/19: 1.702 m (5' 7\").    Weight as of this encounter: 90.2 kg (198 lb 12.8 oz).  Medication Reconciliation: complete    Randi Angel LPN    Advance Care Directive reviewed    "

## 2021-10-26 NOTE — PROGRESS NOTES
"  SUBJECTIVE:   Nursing Notes:   Randi Angel LPN  10/26/2021  1:55 PM  Sign at exiting of workspace  Patient here for a cough over a month now. Cough is productive with bright green mucus. She has not noticed any other symptoms   Randi Angel LPN ..........10/26/2021 1:51 PM   Chief Complaint   Patient presents with     Cough     x 1 month       Initial /64 (BP Location: Right arm, Patient Position: Sitting, Cuff Size: Adult Regular)   Pulse 83   Temp 98.1  F (36.7  C) (Tympanic)   Resp 18   Wt 90.2 kg (198 lb 12.8 oz)   LMP  (LMP Unknown)   SpO2 98%   BMI 31.14 kg/m   Estimated body mass index is 31.14 kg/m  as calculated from the following:    Height as of 1/7/19: 1.702 m (5' 7\").    Weight as of this encounter: 90.2 kg (198 lb 12.8 oz).  Medication Reconciliation: complete    Randi Angel LPN    Advance Care Directive reviewed        Michela He is a 31 year old female who presents to clinic today for cough.  She has had this for over a month.  It has been productive of green mucous.  Tdap was last updated on 8/2/2018.  She had her 2nd covid vaccine on 9/23/2021.  Her cough was getting better, then started getting worse again.  No fever, but feels warmer at night.  No chills.  Some nasal congestion and drainage.  Some sore throat from drainage.  No ear pain. Has had some frontal sinus pain/pressure.  No tooth pain.  She does have allergies this time of the year.  She has been taking an over the counter claritin.  No known covid exposures.  Her kids are dealing with seasonal allergies right now.  Her son and daughter had been tested for covid and were negative.  She was tested for covid last week and it was negative.    HPI    I personally reviewed medications/allergies/history listed below:    There are no problems to display for this patient.    Past Medical History:   Diagnosis Date     Benign neoplasm of cerebral meninges (H)     6/2011     Irregular menstrual cycle     " 2011,Started on DepoProvera at [facility]     LGSIL Pap smear of vagina     7/2011      Past Surgical History:   Procedure Laterality Date     EXTRACTION(S) DENTAL      No Comments Provided     LAPAROSCOPIC SALPINGECTOMY Bilateral 1/7/2019    Procedure: LAPAROSCOPIC SALPINGECTOMY;  Surgeon: Mayito Kincaid MD;  Location:  OR     OTHER SURGICAL HISTORY      8/2011,26272,NERVOUS SYSTEM SURGERY,Removal of meningioma     Family History   Problem Relation Age of Onset     Hypertension Mother         Hypertension     Diabetes Other         Diabetes,FH     Heart Disease Other         Heart Disease,FH     Social History     Tobacco Use     Smoking status: Current Every Day Smoker     Packs/day: 0.25     Types: Cigarettes     Start date: 1/1/2007     Smokeless tobacco: Never Used   Substance Use Topics     Alcohol use: No     Social History     Social History Narrative    Works at [employer].  Single     Current Outpatient Medications   Medication Sig Dispense Refill     Acetaminophen (TYLENOL) 325 MG CAPS Take 650 mg by mouth       azithromycin (ZITHROMAX) 250 MG tablet Take 2 tablets (500 mg) by mouth daily for 1 day, THEN 1 tablet (250 mg) daily for 4 days. 6 tablet 0     citalopram (CELEXA) 10 MG tablet Take 10 mg by mouth daily       ibuprofen (ADVIL/MOTRIN) 800 MG tablet Take 1 tablet by mouth every 8 hours  1     medroxyPROGESTERone (DEPO-PROVERA) 150 MG/ML IM injection Inject 150 mg into the muscle       Allergies   Allergen Reactions     Penicillins Rash       Review of Systems   Constitutional: Negative for fatigue and fever.   HENT: Positive for congestion, postnasal drip, rhinorrhea, sinus pressure, sinus pain and sore throat. Negative for ear pain.    Respiratory: Positive for cough. Negative for shortness of breath.         OBJECTIVE:     /64 (BP Location: Right arm, Patient Position: Sitting, Cuff Size: Adult Regular)   Pulse 83   Temp 98.1  F (36.7  C) (Tympanic)   Resp 18    Wt 90.2 kg (198 lb 12.8 oz)   LMP  (LMP Unknown)   SpO2 98%   BMI 31.14 kg/m    Body mass index is 31.14 kg/m .  Physical Exam  Constitutional:       Appearance: Normal appearance.   HENT:      Head: Normocephalic.      Right Ear: Tympanic membrane normal.      Left Ear: Tympanic membrane normal.      Nose: Congestion present. No rhinorrhea.      Mouth/Throat:      Mouth: Mucous membranes are moist.      Pharynx: Oropharynx is clear. No oropharyngeal exudate or posterior oropharyngeal erythema.   Eyes:      Extraocular Movements: Extraocular movements intact.      Conjunctiva/sclera: Conjunctivae normal.      Pupils: Pupils are equal, round, and reactive to light.   Cardiovascular:      Rate and Rhythm: Normal rate and regular rhythm.      Pulses: Normal pulses.      Heart sounds: Normal heart sounds. No murmur heard.     Pulmonary:      Effort: Pulmonary effort is normal.      Breath sounds: Rhonchi (clears with cough) present. No wheezing or rales.   Musculoskeletal:      Cervical back: Normal range of motion and neck supple.   Lymphadenopathy:      Cervical: No cervical adenopathy.   Neurological:      Mental Status: She is alert.           PHQ-9 SCORE 10/26/2021   PHQ-9 Total Score 17       PHQ-2 Score:     PHQ-2 ( 1999 Pfizer) 10/26/2021   Q1: Little interest or pleasure in doing things 2   Q2: Feeling down, depressed or hopeless 3   PHQ-2 Score 5       No flowsheet data found.      No flowsheet data found.      I personally reviewed results withpatient as listed below:   Diagnostic Test Results:  none     ASSESSMENT/PLAN:       ICD-10-CM    1. Acute non-recurrent frontal sinusitis  J01.10 azithromycin (ZITHROMAX) 250 MG tablet       1.  Recent covid test was negative per patient.  Children recently had colds, which she likely was exposed to and also has superimposed seasonal allergies.  Given length of symptoms, reasonable to try a round of antibiotics.  Prescription for zithromax sent to pharmacy.   Follow up if symptoms are worsening or not improving.    Kennedi Leblanc MD  [facility]

## 2023-11-12 ENCOUNTER — HOSPITAL ENCOUNTER (EMERGENCY)
Facility: OTHER | Age: 33
Discharge: HOME | End: 2023-11-12
Attending: FAMILY MEDICINE | Admitting: FAMILY MEDICINE
Payer: COMMERCIAL

## 2023-11-12 VITALS
HEIGHT: 68 IN | SYSTOLIC BLOOD PRESSURE: 148 MMHG | RESPIRATION RATE: 18 BRPM | DIASTOLIC BLOOD PRESSURE: 91 MMHG | WEIGHT: 190 LBS | OXYGEN SATURATION: 100 % | HEART RATE: 94 BPM | BODY MASS INDEX: 28.79 KG/M2 | TEMPERATURE: 97.9 F

## 2023-11-12 DIAGNOSIS — N76.0 BV (BACTERIAL VAGINOSIS): ICD-10-CM

## 2023-11-12 DIAGNOSIS — B96.89 BV (BACTERIAL VAGINOSIS): ICD-10-CM

## 2023-11-12 LAB
ALBUMIN UR-MCNC: NEGATIVE MG/DL
APPEARANCE UR: ABNORMAL
BILIRUB UR QL STRIP: NEGATIVE
C TRACH DNA SPEC QL PROBE+SIG AMP: NEGATIVE
CLUE CELLS: PRESENT
COLOR UR AUTO: ABNORMAL
GLUCOSE UR STRIP-MCNC: NEGATIVE MG/DL
HCG UR QL: NEGATIVE
HGB UR QL STRIP: NEGATIVE
KETONES UR STRIP-MCNC: NEGATIVE MG/DL
LEUKOCYTE ESTERASE UR QL STRIP: ABNORMAL
MUCOUS THREADS #/AREA URNS LPF: PRESENT /LPF
N GONORRHOEA DNA SPEC QL NAA+PROBE: NEGATIVE
NITRATE UR QL: NEGATIVE
PH UR STRIP: 6.5 [PH] (ref 5–9)
RBC URINE: 3 /HPF
SP GR UR STRIP: 1.02 (ref 1–1.03)
SQUAMOUS EPITHELIAL: 18 /HPF
TRICHOMONAS, WET PREP: ABNORMAL
UROBILINOGEN UR STRIP-MCNC: NORMAL MG/DL
WBC URINE: 5 /HPF
WBC'S/HIGH POWER FIELD, WET PREP: ABNORMAL
YEAST, WET PREP: ABNORMAL

## 2023-11-12 PROCEDURE — 87491 CHLMYD TRACH DNA AMP PROBE: CPT | Performed by: FAMILY MEDICINE

## 2023-11-12 PROCEDURE — 81025 URINE PREGNANCY TEST: CPT | Performed by: FAMILY MEDICINE

## 2023-11-12 PROCEDURE — 99284 EMERGENCY DEPT VISIT MOD MDM: CPT | Performed by: FAMILY MEDICINE

## 2023-11-12 PROCEDURE — 250N000013 HC RX MED GY IP 250 OP 250 PS 637: Performed by: FAMILY MEDICINE

## 2023-11-12 PROCEDURE — 87086 URINE CULTURE/COLONY COUNT: CPT | Performed by: FAMILY MEDICINE

## 2023-11-12 PROCEDURE — 81001 URINALYSIS AUTO W/SCOPE: CPT | Performed by: FAMILY MEDICINE

## 2023-11-12 PROCEDURE — 87210 SMEAR WET MOUNT SALINE/INK: CPT | Performed by: FAMILY MEDICINE

## 2023-11-12 RX ORDER — METRONIDAZOLE 500 MG/1
500 TABLET ORAL 2 TIMES DAILY
Qty: 14 TABLET | Refills: 0 | Status: SHIPPED | OUTPATIENT
Start: 2023-11-12 | End: 2023-11-19

## 2023-11-12 RX ORDER — METRONIDAZOLE 500 MG/1
500 TABLET ORAL ONCE
Status: COMPLETED | OUTPATIENT
Start: 2023-11-12 | End: 2023-11-12

## 2023-11-12 RX ADMIN — IBUPROFEN 600 MG: 200 TABLET, FILM COATED ORAL at 01:20

## 2023-11-12 RX ADMIN — METRONIDAZOLE 500 MG: 500 TABLET ORAL at 02:11

## 2023-11-12 ASSESSMENT — ENCOUNTER SYMPTOMS
ABDOMINAL PAIN: 1
HEMATURIA: 0
DYSURIA: 0
FEVER: 0
CHILLS: 0

## 2023-11-12 ASSESSMENT — ACTIVITIES OF DAILY LIVING (ADL): ADLS_ACUITY_SCORE: 33

## 2023-11-12 NOTE — ED PROVIDER NOTES
History     Chief Complaint   Patient presents with    Urinary Frequency     The history is provided by the patient and medical records.     Michela He is a 33 year old female who has low abdominal pain, which feels like a UTI. No fever or chills. No blood in the urine and no dysuria. She has had symptoms for about two days now.  She would like some ibuprofen for pain.     She has had a tubal ligation. She has had two sexual partners in the past six months, has a PH of both chlamydia and gonorrhea, and is concerned about STD with her current partner.     Allergies:  Allergies   Allergen Reactions    Penicillins Rash       Problem List:    There are no problems to display for this patient.       Past Medical History:    Past Medical History:   Diagnosis Date    Benign neoplasm of cerebral meninges (H)     Irregular menstrual cycle     LGSIL Pap smear of vagina        Past Surgical History:    Past Surgical History:   Procedure Laterality Date    EXTRACTION(S) DENTAL      No Comments Provided    LAPAROSCOPIC SALPINGECTOMY Bilateral 1/7/2019    Procedure: LAPAROSCOPIC SALPINGECTOMY;  Surgeon: Mayito Kincaid MD;  Location:  OR    OTHER SURGICAL HISTORY      8/2011,28636,NERVOUS SYSTEM SURGERY,Removal of meningioma       Family History:    Family History   Problem Relation Age of Onset    Hypertension Mother         Hypertension    Diabetes Other         Diabetes,FH    Heart Disease Other         Heart Disease,FH       Social History:  Marital Status:  Single [1]  Social History     Tobacco Use    Smoking status: Every Day     Packs/day: .25     Types: Cigarettes     Start date: 1/1/2007    Smokeless tobacco: Never   Substance Use Topics    Alcohol use: No    Drug use: No     Comment: Drug use: No        Medications:    metroNIDAZOLE (FLAGYL) 500 MG tablet  Acetaminophen (TYLENOL) 325 MG CAPS  citalopram (CELEXA) 10 MG tablet  ibuprofen (ADVIL/MOTRIN) 800 MG tablet      Review of Systems   Constitutional:   "Negative for chills and fever.   Gastrointestinal:  Positive for abdominal pain.   Genitourinary:  Negative for dysuria and hematuria.   All other systems reviewed and are negative.      Physical Exam   BP: (!) 148/91  Pulse: 94  Temp: 97.9  F (36.6  C)  Resp: 18  Height: 172.7 cm (5' 8\")  Weight: 86.2 kg (190 lb)  SpO2: 100 %      Physical Exam  Vitals and nursing note reviewed.   Constitutional:       General: She is not in acute distress.     Appearance: Normal appearance. She is not ill-appearing, toxic-appearing or diaphoretic.   Abdominal:      General: Bowel sounds are normal.      Palpations: Abdomen is soft.      Tenderness: There is abdominal tenderness.      Comments: She has low, lateral abdominal tenderness which is mild.   Neurological:      General: No focal deficit present.      Mental Status: She is alert and oriented to person, place, and time.   Psychiatric:         Mood and Affect: Mood normal.         Behavior: Behavior normal.       Results for orders placed or performed during the hospital encounter of 11/12/23 (from the past 24 hour(s))   UA with Microscopic reflex to Culture    Specimen: Urine, Clean Catch   Result Value Ref Range    Color Urine Light Yellow Colorless, Straw, Light Yellow, Yellow    Appearance Urine Slightly Cloudy (A) Clear    Glucose Urine Negative Negative mg/dL    Bilirubin Urine Negative Negative    Ketones Urine Negative Negative mg/dL    Specific Gravity Urine 1.019 1.000 - 1.030    Blood Urine Negative Negative    pH Urine 6.5 5.0 - 9.0    Protein Albumin Urine Negative Negative mg/dL    Urobilinogen Urine Normal Normal, 2.0 mg/dL    Nitrite Urine Negative Negative    Leukocyte Esterase Urine Large (A) Negative    Mucus Urine Present (A) None Seen /LPF    RBC Urine 3 (H) <=2 /HPF    WBC Urine 5 <=5 /HPF    Squamous Epithelials Urine 18 (H) <=1 /HPF    Narrative    Urine Culture ordered based on laboratory criteria   HCG qualitative urine (UPT)   Result Value Ref " "Range    hCG Urine Qualitative Negative Negative   Wet prep    Specimen: Vagina; Swab   Result Value Ref Range    Trichomonas Absent Absent    Yeast Absent Absent    Clue Cells Present (A) Absent    WBCs/high power field None None       Medications   ibuprofen (ADVIL/MOTRIN) tablet 600 mg (600 mg Oral $Given 11/12/23 0120)   metroNIDAZOLE (FLAGYL) tablet 500 mg (has no administration in time range)       Assessments & Plan (with Medical Decision Making)  Michela He is a 33 year old female who has low abdominal pain, which feels like a UTI. No fever or chills. No blood in the urine and no dysuria. She has had symptoms for about two days now.  She would like some ibuprofen for pain. She has had a tubal ligation.  She has had two sexual partners in the past six months, has a PH of both chlamydia and gonorrhea, and is concerned about STD with her current partner.   VS in the ED on room air BP (!) 148/91   Pulse 94   Temp 97.9  F (36.6  C) (Temporal)   Resp 18   Ht 1.727 m (5' 8\")   Wt 86.2 kg (190 lb)   SpO2 100%   BMI 28.89 kg/m    Exam shows lateral low abdominal tenderness which is really mild. We gave ibuprofen for pain.   Labs show UA negative, UPT negative, wet prep positive for Clue cells with pH of urine 6.0.  GC Chlamydia testing is not back yet. She wants to go. We gave a first dose of metronidazole in the ED and I sent a Rx to [facility] for the rest.   GC and chlamydia negative.      I have reviewed the nursing notes.    I have reviewed the findings, diagnosis, plan and need for follow up with the patient.  Medical Decision Making  The patient's presentation was of low complexity (an acute and uncomplicated illness or injury).    The patient's evaluation involved:  an assessment requiring an independent historian (see separate area of note for details)  ordering and/or review of 3+ test(s) in this encounter (see separate area of note for details)    The patient's management necessitated moderate " risk (prescription drug management including medications given in the ED).    New Prescriptions    METRONIDAZOLE (FLAGYL) 500 MG TABLET    Take 1 tablet (500 mg) by mouth 2 times daily for 7 days       Final diagnoses:   BV (bacterial vaginosis)       11/12/2023   [facility], Bryant Edmond MD  11/12/23 2325

## 2023-11-12 NOTE — DISCHARGE INSTRUCTIONS
Michela    I sent a prescription to for metronidazole. Take this twice a day until gone.     We will call you if the gonorrhea or chlamydia tests are positive.     Thank you for choosing our Emergency Department for your care.     You may receive a phone call or letter for a survey about your care in our ED.  Please complete this as this is how we improve care for our patients.     If you have any questions after leaving the ED you can call or text me on my cell phone at [phone].  This does not mean that I am on call, but I will get back to you.  If you are not doing well please return to the ED.     Sincerely,    Dr Anderson Monreal M.D.

## 2023-11-12 NOTE — ED TRIAGE NOTES
"Pt presents to ED with concerns for UTI.  Urinary frequency and \"Cramping feeling\" reported.     Triage Assessment (Adult)       Row Name 11/12/23 0102          Triage Assessment    Airway WDL WDL        Respiratory WDL    Respiratory WDL WDL        Skin Circulation/Temperature WDL    Skin Circulation/Temperature WDL WDL        Cardiac WDL    Cardiac WDL WDL        Peripheral/Neurovascular WDL    Peripheral Neurovascular WDL WDL        Cognitive/Neuro/Behavioral WDL    Cognitive/Neuro/Behavioral WDL WDL                     "

## 2023-11-14 LAB — BACTERIA UR CULT: NO GROWTH

## 2024-03-21 ENCOUNTER — HOSPITAL ENCOUNTER (EMERGENCY)
Facility: OTHER | Age: 34
Discharge: HOME | End: 2024-03-21
Attending: FAMILY MEDICINE | Admitting: FAMILY MEDICINE
Payer: COMMERCIAL

## 2024-03-21 VITALS
TEMPERATURE: 97.6 F | HEIGHT: 67 IN | SYSTOLIC BLOOD PRESSURE: 145 MMHG | OXYGEN SATURATION: 98 % | DIASTOLIC BLOOD PRESSURE: 100 MMHG | HEART RATE: 83 BPM | RESPIRATION RATE: 16 BRPM | WEIGHT: 205 LBS | BODY MASS INDEX: 32.18 KG/M2

## 2024-03-21 DIAGNOSIS — K08.89 PAIN, DENTAL: ICD-10-CM

## 2024-03-21 PROCEDURE — 99283 EMERGENCY DEPT VISIT LOW MDM: CPT | Performed by: FAMILY MEDICINE

## 2024-03-21 PROCEDURE — 99284 EMERGENCY DEPT VISIT MOD MDM: CPT | Performed by: FAMILY MEDICINE

## 2024-03-21 RX ORDER — ACETAMINOPHEN 500 MG
1000 TABLET ORAL ONCE
Status: DISCONTINUED | OUTPATIENT
Start: 2024-03-21 | End: 2024-03-21

## 2024-03-21 RX ORDER — CLINDAMYCIN HCL 150 MG
150 CAPSULE ORAL 4 TIMES DAILY
Qty: 30 CAPSULE | Refills: 0 | Status: SHIPPED | OUTPATIENT
Start: 2024-03-21

## 2024-03-21 RX ORDER — HYDROCODONE BITARTRATE AND ACETAMINOPHEN 5; 325 MG/1; MG/1
1 TABLET ORAL EVERY 6 HOURS PRN
Qty: 6 TABLET | Refills: 0 | Status: SHIPPED | OUTPATIENT
Start: 2024-03-21

## 2024-03-21 ASSESSMENT — COLUMBIA-SUICIDE SEVERITY RATING SCALE - C-SSRS
6. HAVE YOU EVER DONE ANYTHING, STARTED TO DO ANYTHING, OR PREPARED TO DO ANYTHING TO END YOUR LIFE?: NO
1. IN THE PAST MONTH, HAVE YOU WISHED YOU WERE DEAD OR WISHED YOU COULD GO TO SLEEP AND NOT WAKE UP?: NO
2. HAVE YOU ACTUALLY HAD ANY THOUGHTS OF KILLING YOURSELF IN THE PAST MONTH?: NO

## 2024-03-21 NOTE — DISCHARGE INSTRUCTIONS
Michela    I hope that the clindamycin and Vicodin help you feel better soon.     Thank you for choosing our Emergency Department for your care.     You may receive a phone call or letter for a survey about your care in our ED.  Please complete this as this is how we improve care for our patients.     If you have any questions after leaving the ED you can call or text me on my cell phone at [phone].  This does not mean that I am on call, but I will get back to you.  If you are not doing well please return to the ED.     Sincerely,    Dr Anderson Monreal M.D.

## 2024-03-21 NOTE — ED PROVIDER NOTES
History     Chief Complaint   Patient presents with    Dental Pain     X 2 days     The history is provided by the patient and medical records.     Michela He is a 34 year old female who has tooth pain. Started a few days ago and she has not had problems like this before. No fever, no injury.  She had some ibuprofen, no other pain meds.    No history of narcotic addiction or narcotic abuse.    Allergies:  Allergies   Allergen Reactions    Penicillins Rash       Problem List:    There are no problems to display for this patient.       Past Medical History:    Past Medical History:   Diagnosis Date    Benign neoplasm of cerebral meninges (H)     Irregular menstrual cycle     LGSIL Pap smear of vagina        Past Surgical History:    Past Surgical History:   Procedure Laterality Date    EXTRACTION(S) DENTAL      No Comments Provided    LAPAROSCOPIC SALPINGECTOMY Bilateral 1/7/2019    Procedure: LAPAROSCOPIC SALPINGECTOMY;  Surgeon: Mayito Kincaid MD;  Location:  OR    OTHER SURGICAL HISTORY      8/2011,14616,NERVOUS SYSTEM SURGERY,Removal of meningioma       Family History:    Family History   Problem Relation Age of Onset    Hypertension Mother         Hypertension    Diabetes Other         Diabetes,FH    Heart Disease Other         Heart Disease,FH       Social History:  Marital Status:  Single [1]  Social History     Tobacco Use    Smoking status: Every Day     Packs/day: .25     Types: Cigarettes     Start date: 1/1/2007    Smokeless tobacco: Never   Substance Use Topics    Alcohol use: No    Drug use: No     Comment: Drug use: No        Medications:    Acetaminophen (TYLENOL) 325 MG CAPS  clindamycin (CLEOCIN) 150 MG capsule  HYDROcodone-acetaminophen (NORCO) 5-325 MG tablet  ibuprofen (ADVIL/MOTRIN) 800 MG tablet          Review of Systems   HENT:  Positive for dental problem.    All other systems reviewed and are negative.      Physical Exam   BP: (!) 145/100  Pulse: 83  Temp: 97.6  F (36.4  " C)  Resp: 16  Height: 170.2 cm (5' 7\")  Weight: 93 kg (205 lb)  SpO2: 98 %      Physical Exam  Vitals and nursing note reviewed.   Constitutional:       General: She is not in acute distress.     Appearance: Normal appearance. She is not ill-appearing, toxic-appearing or diaphoretic.   HENT:      Mouth/Throat:      Comments: Tooth #7 is tender. No abscess, no fracture seen, no swelling.  Neurological:      Mental Status: She is alert.         Assessments & Plan (with Medical Decision Making)  Michela He is a 34 year old female who has tooth pain. Started a few days ago and she has not had problems like this before. No fever, no injury.  She had some ibuprofen, no other pain meds.  No history of narcotic addiction or narcotic abuse.  VS in the ED BP (!) 145/100   Pulse 83   Temp 97.6  F (36.4  C) (Tympanic)   Resp 16   Ht 1.702 m (5' 7\")   Wt 93 kg (205 lb)   SpO2 98%   BMI 32.11 kg/m    Exam shows tooth #7 is tender.   We will treat with clindamycin (PCN allergy) and 6 tablets of Vicodin.      I have reviewed the nursing notes.    I have reviewed the findings, diagnosis, plan and need for follow up with the patient.  Medical Decision Making  The patient's presentation was of low complexity (an acute and uncomplicated illness or injury).    The patient's evaluation involved:  an assessment requiring an independent historian (see separate area of note for details)    The patient's management necessitated moderate risk (prescription drug management including medications given in the ED).        New Prescriptions    CLINDAMYCIN (CLEOCIN) 150 MG CAPSULE    Take 1 capsule (150 mg) by mouth 4 times daily    HYDROCODONE-ACETAMINOPHEN (NORCO) 5-325 MG TABLET    Take 1 tablet by mouth every 6 hours as needed for severe pain       Final diagnoses:   Pain, dental - tooth #7       3/21/2024   [facility] AND [facility], Bryant Edmond MD  03/21/24 0057    "

## 2024-05-04 ENCOUNTER — HEALTH MAINTENANCE LETTER (OUTPATIENT)
Age: 34
End: 2024-05-04

## 2024-06-05 ENCOUNTER — HOSPITAL ENCOUNTER (OUTPATIENT)
Dept: GENERAL RADIOLOGY | Facility: OTHER | Age: 34
Discharge: HOME | End: 2024-06-05
Attending: FAMILY MEDICINE
Payer: COMMERCIAL

## 2024-06-05 ENCOUNTER — OFFICE VISIT (OUTPATIENT)
Dept: FAMILY MEDICINE | Facility: OTHER | Age: 34
End: 2024-06-05
Attending: FAMILY MEDICINE
Payer: COMMERCIAL

## 2024-06-05 VITALS
HEART RATE: 73 BPM | DIASTOLIC BLOOD PRESSURE: 78 MMHG | OXYGEN SATURATION: 99 % | RESPIRATION RATE: 16 BRPM | BODY MASS INDEX: 33.17 KG/M2 | WEIGHT: 211.8 LBS | SYSTOLIC BLOOD PRESSURE: 118 MMHG | TEMPERATURE: 97.2 F

## 2024-06-05 DIAGNOSIS — M25.532 LEFT WRIST PAIN: Primary | ICD-10-CM

## 2024-06-05 PROCEDURE — G0463 HOSPITAL OUTPT CLINIC VISIT: HCPCS

## 2024-06-05 PROCEDURE — 73110 X-RAY EXAM OF WRIST: CPT | Mod: LT

## 2024-06-05 PROCEDURE — G0463 HOSPITAL OUTPT CLINIC VISIT: HCPCS | Mod: 25

## 2024-06-05 PROCEDURE — 99214 OFFICE O/P EST MOD 30 MIN: CPT | Performed by: FAMILY MEDICINE

## 2024-06-05 RX ORDER — NAPROXEN 500 MG/1
500 TABLET ORAL 2 TIMES DAILY WITH MEALS
Qty: 60 TABLET | Refills: 0 | Status: SHIPPED | OUTPATIENT
Start: 2024-06-05

## 2024-06-05 ASSESSMENT — PAIN SCALES - GENERAL: PAINLEVEL: EXTREME PAIN (8)

## 2024-06-05 NOTE — PROGRESS NOTES
Sports Medicine Office Note    HPI:  34-year-old female coming in for evaluation of left wrist pain.  Her pain has been present for approximately 4 months.  She had a fall onto an outstretched hand and this seems to have started her pain.  Her pain did go away but then came back without inciting event or injury.  Her current pain is 8/10.  She characterized the pain as dull and achy.  She has difficulty with lifting.  She has tried bracing and gentle ROM exercises with minimal improvement of symptoms.      EXAM:  /78 (BP Location: Right arm, Patient Position: Sitting, Cuff Size: Adult Large)   Pulse 73   Temp 97.2  F (36.2  C) (Temporal)   Resp 16   Wt 96.1 kg (211 lb 12.8 oz)   SpO2 99%   BMI 33.17 kg/m    MUSCULOSKELETAL EXAM:  LEFT WRIST  Inspection:  -No gross deformity  -No bruising or swelling  -Scars:  None    Tenderness to palpation of the:  -Medial epicondyle:  Negative  -Lateral epicondyle:  Negative  -Radial head:  Negative  -Radial shaft:  Negative  -Ulnar shaft:  Negative  -Forearm flexors and extensors:  Negative  -Ulnar styloid:  Negative  -Distal radius:  Negative  -Jayden's tubercle:  Negative  -Scapholunate ligament:  Negative  -Scaphoid in anatomical snuffbox:  Negative  -1st CMC joint:  Negative  -1st MCP joint:  Negative  -Metacarpals:  Negative  -TFCC: Positive  -ECU: Positive    Strength:  - strength:  5/5  -Wrist flexion:  5/5  -Wrist extension:  5/5    Sensation:  -Intact to light touch in the radial, median, and ulnar nerve distributions    Motor:  -Intact AIN, PIN, and IO    Special Tests:  -TFCC grind test: Positive    Other:  -No signs of cyanosis. Normal skin temperature of the upper extremity.  -Elbow:  No gross deformity. Full range of motion.  -Right wrist:  No gross deformity. No palpable tenderness. Normal strength and ROM.      IMAGIN2024: 3 view left wrist x-ray  - No fracture, dislocation, or bony lesion      ASSESSMENT/PLAN:  Diagnoses and all orders for  this visit:  Left wrist pain  -     XR Wrist Left G/E 3 Views  -     naproxen (NAPROSYN) 500 MG tablet; Take 1 tablet (500 mg) by mouth 2 times daily (with meals) X14 days then 1 bid thereafter.  Take with food.  Do not take with other nsaids.    34-year-old female with left wrist pain.  Her symptoms seem to be either due to a TFCC tear or ECU tendinopathy.  Based on palpation exam it is difficult to differentiate these 2 structures.  X-rays were performed in the office today and personally reviewed by me with the findings as demonstrated above by my interpretation.  We discussed treatment options to include activity modification, rest, bracing, ice, topical medications, oral medications, injections, home exercises, and therapy.  - Naproxen 500 mg twice daily x 14 days then as needed thereafter  - If symptoms or not improving, recommend patient call us for a referral to hand therapy  - If anti-inflammatories and therapy are not effective for managing symptoms, consider the need for MR arthrogram      Nima Lagos MD  6/5/2024  1:09 PM    Total time spent with this patient was 24 minutes which included chart review, visualization and independent interpretation of images, time spent with the patient, and documentation.    Procedure time:  0 minute(s)

## 2024-11-21 ENCOUNTER — HOSPITAL ENCOUNTER (EMERGENCY)
Facility: OTHER | Age: 34
Discharge: HOME | End: 2024-11-21
Attending: FAMILY MEDICINE
Payer: COMMERCIAL

## 2024-11-21 VITALS
TEMPERATURE: 96.6 F | RESPIRATION RATE: 18 BRPM | DIASTOLIC BLOOD PRESSURE: 82 MMHG | OXYGEN SATURATION: 97 % | SYSTOLIC BLOOD PRESSURE: 137 MMHG | WEIGHT: 200 LBS | HEART RATE: 76 BPM | HEIGHT: 67 IN | BODY MASS INDEX: 31.39 KG/M2

## 2024-11-21 DIAGNOSIS — K08.89 PAIN, DENTAL: ICD-10-CM

## 2024-11-21 PROCEDURE — 99283 EMERGENCY DEPT VISIT LOW MDM: CPT | Performed by: FAMILY MEDICINE

## 2024-11-21 PROCEDURE — 99284 EMERGENCY DEPT VISIT MOD MDM: CPT | Performed by: FAMILY MEDICINE

## 2024-11-21 RX ORDER — DOXYCYCLINE 100 MG/1
100 CAPSULE ORAL 2 TIMES DAILY
Qty: 28 CAPSULE | Refills: 0 | Status: SHIPPED | OUTPATIENT
Start: 2024-11-21

## 2024-11-21 RX ORDER — HYDROCODONE BITARTRATE AND ACETAMINOPHEN 5; 325 MG/1; MG/1
1 TABLET ORAL EVERY 6 HOURS PRN
Qty: 6 TABLET | Refills: 0 | Status: SHIPPED | OUTPATIENT
Start: 2024-11-21

## 2024-11-21 ASSESSMENT — COLUMBIA-SUICIDE SEVERITY RATING SCALE - C-SSRS
2. HAVE YOU ACTUALLY HAD ANY THOUGHTS OF KILLING YOURSELF IN THE PAST MONTH?: NO
6. HAVE YOU EVER DONE ANYTHING, STARTED TO DO ANYTHING, OR PREPARED TO DO ANYTHING TO END YOUR LIFE?: NO
1. IN THE PAST MONTH, HAVE YOU WISHED YOU WERE DEAD OR WISHED YOU COULD GO TO SLEEP AND NOT WAKE UP?: NO

## 2024-11-21 NOTE — DISCHARGE INSTRUCTIONS
Michela    We will get you some doxycycline and Vicodin.    Do not drive or operate machinery for six hours after taking this medicine. This medicine will make you more likely to fall so please be careful.  Do not use firearms for six hours after taking this medicine.     Thank you for choosing our Emergency Department for your care.     You may receive a phone call or letter for a survey about your care in our ED.  Please complete this as this is how we improve care for our patients.     If you have any questions after leaving the ED you can call or text me on my cell phone at [phone].  I am not on call so if I do not answer my phone please leave a message- I will get back to you.  If you are not doing well please return to the ED.     Sincerely,    Dr Anderson Monreal M.D.  Medical Director  [facility] Emergency Department

## 2024-11-21 NOTE — ED TRIAGE NOTES
Pt states tooth pain started last night and got significantly worse tonight.  Had repair done to this tooth about 7 years ago.  It was a chipped tooth that was capped.  No issue from that till yesterday.       Triage Assessment (Adult)       Row Name 11/21/24 0134          Triage Assessment    Airway WDL WDL        Respiratory WDL    Respiratory WDL WDL        Skin Circulation/Temperature WDL    Skin Circulation/Temperature WDL WDL        Cardiac WDL    Cardiac WDL WDL        Peripheral/Neurovascular WDL    Peripheral Neurovascular WDL WDL        Cognitive/Neuro/Behavioral WDL    Cognitive/Neuro/Behavioral WDL WDL

## 2024-11-21 NOTE — ED PROVIDER NOTES
History     Chief Complaint   Patient presents with    Dental Pain     The history is provided by the patient.     Michela He is a 34 year old female here with dental pain. It is her top right tooth. This started yesterday but was not too bad. She cannot sleep tonight. No fever.     Allergies:  Allergies   Allergen Reactions    Penicillins Rash       Problem List:    There are no active problems to display for this patient.       Past Medical History:    Past Medical History:   Diagnosis Date    Benign neoplasm of cerebral meninges (H)     Irregular menstrual cycle     LGSIL Pap smear of vagina        Past Surgical History:    Past Surgical History:   Procedure Laterality Date    EXTRACTION(S) DENTAL      No Comments Provided    LAPAROSCOPIC SALPINGECTOMY Bilateral 1/7/2019    Procedure: LAPAROSCOPIC SALPINGECTOMY;  Surgeon: Mayito Kincaid MD;  Location: [facility] OR    OTHER SURGICAL HISTORY      8/2011,22432,NERVOUS SYSTEM SURGERY,Removal of meningioma       Family History:    Family History   Problem Relation Age of Onset    Hypertension Mother         Hypertension    Diabetes Other         Diabetes,FH    Heart Disease Other         Heart Disease,FH       Social History:  Marital Status:  Single [1]  Social History     Tobacco Use    Smoking status: Every Day     Current packs/day: 0.25     Average packs/day: 0.3 packs/day for 17.9 years (4.5 ttl pk-yrs)     Types: Cigarettes     Start date: 1/1/2007    Smokeless tobacco: Never   Substance Use Topics    Alcohol use: No    Drug use: No     Comment: Drug use: No        Medications:    Acetaminophen (TYLENOL) 325 MG CAPS  clindamycin (CLEOCIN) 150 MG capsule  doxycycline monohydrate (MONODOX) 100 MG capsule  HYDROcodone-acetaminophen (NORCO) 5-325 MG tablet  ibuprofen (ADVIL/MOTRIN) 800 MG tablet  naproxen (NAPROSYN) 500 MG tablet          Review of Systems   HENT:  Positive for dental problem.    All other systems reviewed and are negative.      Physical Exam  "  BP: 137/82  Pulse: 76  Temp: (!) 96.6  F (35.9  C)  Resp: 18  Height: 170.2 cm (5' 7\")  Weight: 90.7 kg (200 lb)  SpO2: 97 %      Physical Exam  Vitals and nursing note reviewed.   HENT:      Mouth/Throat:      Comments: Tooth #8 is tender.  Neurological:      Mental Status: She is alert.         Assessments & Plan (with Medical Decision Making)  Michela He is a 34 year old female here with dental pain. It is her top right tooth. This started yesterday but was not too bad. She cannot sleep tonight. No fever.   VSS in the ED /82   Pulse 76   Temp (!) 96.6  F (35.9  C) (Tympanic)   Resp 18   Ht 1.702 m (5' 7\")   Wt 90.7 kg (200 lb)   LMP 11/07/2024 (Approximate)   SpO2 97%   BMI 31.32 kg/m    Exam shows tooth #8 is tender.  We will treat with doxycycline and Vicodin.      I have reviewed the nursing notes.    I have reviewed the findings, diagnosis, plan and need for follow up with the patient.  Medical Decision Making  The patient's presentation was of low complexity (an acute and uncomplicated illness or injury).    The patient's evaluation involved:  history and exam without other MDM data elements    The patient's management necessitated moderate risk (prescription drug management including medications given in the ED).        New Prescriptions    DOXYCYCLINE MONOHYDRATE (MONODOX) 100 MG CAPSULE    Take 1 capsule (100 mg) by mouth 2 times daily.    HYDROCODONE-ACETAMINOPHEN (NORCO) 5-325 MG TABLET    Take 1 tablet by mouth every 6 hours as needed for severe pain.       Final diagnoses:   Pain, dental - tooth #8       11/21/2024   [facility] AND [facility], Bryant Edmond MD  11/21/24 0152    "

## 2025-03-19 ENCOUNTER — HOSPITAL ENCOUNTER (EMERGENCY)
Facility: OTHER | Age: 35
Discharge: HOME | End: 2025-03-20
Attending: FAMILY MEDICINE
Payer: COMMERCIAL

## 2025-03-19 ENCOUNTER — APPOINTMENT (OUTPATIENT)
Dept: GENERAL RADIOLOGY | Facility: OTHER | Age: 35
End: 2025-03-19
Attending: FAMILY MEDICINE
Payer: COMMERCIAL

## 2025-03-19 VITALS
SYSTOLIC BLOOD PRESSURE: 140 MMHG | RESPIRATION RATE: 18 BRPM | BODY MASS INDEX: 34.53 KG/M2 | OXYGEN SATURATION: 96 % | HEART RATE: 120 BPM | TEMPERATURE: 100.8 F | WEIGHT: 220 LBS | DIASTOLIC BLOOD PRESSURE: 99 MMHG | HEIGHT: 67 IN

## 2025-03-19 DIAGNOSIS — J06.9 VIRAL URI WITH COUGH: ICD-10-CM

## 2025-03-19 LAB
ANION GAP SERPL CALCULATED.3IONS-SCNC: 14 MMOL/L (ref 7–15)
BASOPHILS # BLD AUTO: 0 10E3/UL (ref 0–0.2)
BASOPHILS NFR BLD AUTO: 0 %
BUN SERPL-MCNC: 8.3 MG/DL (ref 6–20)
CALCIUM SERPL-MCNC: 8.6 MG/DL (ref 8.8–10.4)
CHLORIDE SERPL-SCNC: 100 MMOL/L (ref 98–107)
CREAT SERPL-MCNC: 0.59 MG/DL (ref 0.51–0.95)
EGFRCR SERPLBLD CKD-EPI 2021: >90 ML/MIN/1.73M2
EOSINOPHIL # BLD AUTO: 0.4 10E3/UL (ref 0–0.7)
EOSINOPHIL NFR BLD AUTO: 4 %
ERYTHROCYTE [DISTWIDTH] IN BLOOD BY AUTOMATED COUNT: 14.5 % (ref 10–15)
FLUAV RNA SPEC QL NAA+PROBE: NEGATIVE
FLUBV RNA RESP QL NAA+PROBE: NEGATIVE
GLUCOSE SERPL-MCNC: 115 MG/DL (ref 70–99)
HCG SERPL QL: NEGATIVE
HCO3 SERPL-SCNC: 23 MMOL/L (ref 22–29)
HCT VFR BLD AUTO: 37.5 % (ref 35–47)
HGB BLD-MCNC: 12.6 G/DL (ref 11.7–15.7)
IMM GRANULOCYTES # BLD: 0.1 10E3/UL
IMM GRANULOCYTES NFR BLD: 1 %
LYMPHOCYTES # BLD AUTO: 1.5 10E3/UL (ref 0.8–5.3)
LYMPHOCYTES NFR BLD AUTO: 14 %
MCH RBC QN AUTO: 27.5 PG (ref 26.5–33)
MCHC RBC AUTO-ENTMCNC: 33.6 G/DL (ref 31.5–36.5)
MCV RBC AUTO: 82 FL (ref 78–100)
MONOCYTES # BLD AUTO: 0.9 10E3/UL (ref 0–1.3)
MONOCYTES NFR BLD AUTO: 9 %
NEUTROPHILS # BLD AUTO: 7.7 10E3/UL (ref 1.6–8.3)
NEUTROPHILS NFR BLD AUTO: 73 %
NRBC # BLD AUTO: 0 10E3/UL
NRBC BLD AUTO-RTO: 0 /100
PLATELET # BLD AUTO: 264 10E3/UL (ref 150–450)
POTASSIUM SERPL-SCNC: 4 MMOL/L (ref 3.4–5.3)
RBC # BLD AUTO: 4.58 10E6/UL (ref 3.8–5.2)
RSV RNA SPEC NAA+PROBE: NEGATIVE
SARS-COV-2 RNA RESP QL NAA+PROBE: NEGATIVE
SODIUM SERPL-SCNC: 137 MMOL/L (ref 135–145)
WBC # BLD AUTO: 10.5 10E3/UL (ref 4–11)

## 2025-03-19 PROCEDURE — 84703 CHORIONIC GONADOTROPIN ASSAY: CPT | Performed by: FAMILY MEDICINE

## 2025-03-19 PROCEDURE — 96361 HYDRATE IV INFUSION ADD-ON: CPT | Performed by: FAMILY MEDICINE

## 2025-03-19 PROCEDURE — 85025 COMPLETE CBC W/AUTO DIFF WBC: CPT | Performed by: FAMILY MEDICINE

## 2025-03-19 PROCEDURE — 258N000003 HC RX IP 258 OP 636: Performed by: FAMILY MEDICINE

## 2025-03-19 PROCEDURE — 80048 BASIC METABOLIC PNL TOTAL CA: CPT | Performed by: FAMILY MEDICINE

## 2025-03-19 PROCEDURE — 250N000011 HC RX IP 250 OP 636: Performed by: FAMILY MEDICINE

## 2025-03-19 PROCEDURE — 99284 EMERGENCY DEPT VISIT MOD MDM: CPT | Mod: 25 | Performed by: FAMILY MEDICINE

## 2025-03-19 PROCEDURE — 96374 THER/PROPH/DIAG INJ IV PUSH: CPT | Performed by: FAMILY MEDICINE

## 2025-03-19 PROCEDURE — 36415 COLL VENOUS BLD VENIPUNCTURE: CPT | Performed by: FAMILY MEDICINE

## 2025-03-19 PROCEDURE — 71045 X-RAY EXAM CHEST 1 VIEW: CPT

## 2025-03-19 PROCEDURE — 99284 EMERGENCY DEPT VISIT MOD MDM: CPT | Performed by: FAMILY MEDICINE

## 2025-03-19 PROCEDURE — 87637 SARSCOV2&INF A&B&RSV AMP PRB: CPT | Performed by: FAMILY MEDICINE

## 2025-03-19 RX ORDER — KETOROLAC TROMETHAMINE 30 MG/ML
30 INJECTION, SOLUTION INTRAMUSCULAR; INTRAVENOUS ONCE
Status: COMPLETED | OUTPATIENT
Start: 2025-03-19 | End: 2025-03-19

## 2025-03-19 RX ADMIN — SODIUM CHLORIDE 1000 ML: 0.9 INJECTION, SOLUTION INTRAVENOUS at 22:24

## 2025-03-19 RX ADMIN — KETOROLAC TROMETHAMINE 30 MG: 30 INJECTION, SOLUTION INTRAMUSCULAR at 22:24

## 2025-03-19 ASSESSMENT — ENCOUNTER SYMPTOMS
MYALGIAS: 1
SHORTNESS OF BREATH: 1
COUGH: 1
CHILLS: 1

## 2025-03-19 ASSESSMENT — ACTIVITIES OF DAILY LIVING (ADL)
ADLS_ACUITY_SCORE: 41
ADLS_ACUITY_SCORE: 41

## 2025-03-20 LAB — HOLD SPECIMEN: NORMAL

## 2025-03-20 NOTE — ED TRIAGE NOTES
Pt c/o fever, cough, chills, SOB, body aches. Started 1 week ago. HR 120s in triage, temp 100.8, took Alkaseltzer cold last at 2030.      Triage Assessment (Adult)       Row Name 03/19/25 7237          Triage Assessment    Airway WDL WDL        Respiratory WDL    Respiratory WDL X;cough     Cough Frequency frequent     Cough Type productive        Skin Circulation/Temperature WDL    Skin Circulation/Temperature WDL WDL        Cardiac WDL    Cardiac WDL WDL        Cognitive/Neuro/Behavioral WDL    Cognitive/Neuro/Behavioral WDL WDL

## 2025-03-20 NOTE — ED PROVIDER NOTES
History     Chief Complaint   Patient presents with    Cough    Fever    Generalized Body Aches    Chills     The history is provided by the patient.     Michela He is a 35 year old female here with cough, congestion, body aches, chills and SOB.  She has been sick a week and overall feels about the same.  Her son was sick a few weeks ago and negative for COVID, influenza and RSV. She tried Silvina [place] Cold and Flu which did not help.     Allergies:  Allergies   Allergen Reactions    Penicillins Rash       Problem List:    There are no active problems to display for this patient.       Past Medical History:    Past Medical History:   Diagnosis Date    Benign neoplasm of cerebral meninges (H)     Irregular menstrual cycle     LGSIL Pap smear of vagina        Past Surgical History:    Past Surgical History:   Procedure Laterality Date    EXTRACTION(S) DENTAL      No Comments Provided    LAPAROSCOPIC SALPINGECTOMY Bilateral 1/7/2019    Procedure: LAPAROSCOPIC SALPINGECTOMY;  Surgeon: Mayito Kincaid MD;  Location:  OR    OTHER SURGICAL HISTORY      8/2011,33098,NERVOUS SYSTEM SURGERY,Removal of meningioma       Family History:    Family History   Problem Relation Age of Onset    Hypertension Mother         Hypertension    Diabetes Other         Diabetes,FH    Heart Disease Other         Heart Disease,FH       Social History:  Marital Status:  Single [1]  Social History     Tobacco Use    Smoking status: Every Day     Current packs/day: 0.25     Average packs/day: 0.3 packs/day for 18.2 years (4.6 ttl pk-yrs)     Types: Cigarettes     Start date: 1/1/2007    Smokeless tobacco: Never   Substance Use Topics    Alcohol use: No    Drug use: No     Comment: Drug use: No        Medications:    Acetaminophen (TYLENOL) 325 MG CAPS  clindamycin (CLEOCIN) 150 MG capsule  doxycycline monohydrate (MONODOX) 100 MG capsule  HYDROcodone-acetaminophen (NORCO) 5-325 MG tablet  ibuprofen (ADVIL/MOTRIN) 800 MG tablet  naproxen  "(NAPROSYN) 500 MG tablet          Review of Systems   Constitutional:  Positive for chills.   Respiratory:  Positive for cough and shortness of breath.    Musculoskeletal:  Positive for myalgias.   All other systems reviewed and are negative.      Physical Exam   BP: (!) 140/99  Pulse: 120  Temp: 100.8  F (38.2  C)  Resp: 18  Height: 170.2 cm (5' 7\")  Weight: 99.8 kg (220 lb)  SpO2: 96 %      Physical Exam  Vitals and nursing note reviewed.   Constitutional:       General: She is not in acute distress.     Appearance: Normal appearance. She is not ill-appearing.   HENT:      Right Ear: Tympanic membrane, ear canal and external ear normal.      Left Ear: Tympanic membrane, ear canal and external ear normal.      Mouth/Throat:      Mouth: Mucous membranes are moist.      Pharynx: Oropharynx is clear. No oropharyngeal exudate or posterior oropharyngeal erythema.   Eyes:      Extraocular Movements: Extraocular movements intact.      Conjunctiva/sclera: Conjunctivae normal.   Cardiovascular:      Rate and Rhythm: Normal rate and regular rhythm.      Pulses: Normal pulses.      Heart sounds: Normal heart sounds.   Pulmonary:      Effort: Pulmonary effort is normal.      Comments: Decreased breath sounds on the left side  Skin:     General: Skin is warm and dry.   Neurological:      General: No focal deficit present.      Mental Status: She is alert and oriented to person, place, and time.         Results for orders placed or performed during the hospital encounter of 03/19/25 (from the past 24 hours)   Influenza A/B, RSV and SARS-CoV2 PCR (COVID-19) Nose    Specimen: Nose; Swab   Result Value Ref Range    Influenza A PCR Negative Negative    Influenza B PCR Negative Negative    RSV PCR Negative Negative    SARS CoV2 PCR Negative Negative    Narrative    Testing was performed using the Xpert Xpress CoV2/Flu/RSV Assay on the Cepheid GeneXpert Instrument. This test should be ordered for the detection of SARS-CoV2, influenza, " and RSV viruses in individuals with signs and symptoms of respiratory tract infection. This test is for in vitro diagnostic use under the [place] FDA for laboratories certified under CLIA to perform high or moderate complexity testing. This test has been [place] FDA cleared. A negative result does not rule out the presence of PCR inhibitors in the specimen or target RNA in concentration below the limit of detection for the assay. If only one viral target is positive but coinfection with multiple targets is suspected, the sample should be re-tested with another FDA cleared, approved, or authorized test, if coninfection would change clinical management. This test was validated by the [facility]. These laboratories are certified under the Clinical Laboratory Improvement Amendments of 1988 (CLIA-88) as qualified to perfom high complexity laboratory testing.   CBC with platelets differential    Narrative    The following orders were created for panel order CBC with platelets differential.  Procedure                               Abnormality         Status                     ---------                               -----------         ------                     CBC with platelets and ...[[id number]]                      Final result                 Please view results for these tests on the individual orders.   Basic metabolic panel   Result Value Ref Range    Sodium 137 135 - 145 mmol/L    Potassium 4.0 3.4 - 5.3 mmol/L    Chloride 100 98 - 107 mmol/L    Carbon Dioxide (CO2) 23 22 - 29 mmol/L    Anion Gap 14 7 - 15 mmol/L    Urea Nitrogen 8.3 6.0 - 20.0 mg/dL    Creatinine 0.59 0.51 - 0.95 mg/dL    GFR Estimate >90 >60 mL/min/1.73m2    Calcium 8.6 (L) 8.8 - 10.4 mg/dL    Glucose 115 (H) 70 - 99 mg/dL   HCG qualitative Blood   Result Value Ref Range    hCG Serum Qualitative Negative Negative   CBC with platelets and differential   Result Value Ref Range    WBC Count 10.5 4.0 - 11.0 10e3/uL    RBC Count 4.58 3.80  - 5.20 10e6/uL    Hemoglobin 12.6 11.7 - 15.7 g/dL    Hematocrit 37.5 35.0 - 47.0 %    MCV 82 78 - 100 fL    MCH 27.5 26.5 - 33.0 pg    MCHC 33.6 31.5 - 36.5 g/dL    RDW 14.5 10.0 - 15.0 %    Platelet Count 264 150 - 450 10e3/uL    % Neutrophils 73 %    % Lymphocytes 14 %    % Monocytes 9 %    % Eosinophils 4 %    % Basophils 0 %    % Immature Granulocytes 1 %    NRBCs per 100 WBC 0 <1 /100    Absolute Neutrophils 7.7 1.6 - 8.3 10e3/uL    Absolute Lymphocytes 1.5 0.8 - 5.3 10e3/uL    Absolute Monocytes 0.9 0.0 - 1.3 10e3/uL    Absolute Eosinophils 0.4 0.0 - 0.7 10e3/uL    Absolute Basophils 0.0 0.0 - 0.2 10e3/uL    Absolute Immature Granulocytes 0.1 <=0.4 10e3/uL    Absolute NRBCs 0.0 10e3/uL   Extra Tube    Narrative    The following orders were created for panel order Extra Tube.  Procedure                               Abnormality         Status                     ---------                               -----------         ------                     Extra Blood Culture Bottle[[id number]]                      In process                   Please view results for these tests on the individual orders.   XR Chest Port 1 View    Narrative    EXAM: XR CHEST PORT 1 VIEW  LOCATION: [facility]  DATE: 3/19/2025    INDICATION: cough, fever  COMPARISON: None.      Impression    IMPRESSION: Negative chest.       Medications   sodium chloride 0.9% BOLUS 1,000 mL (1,000 mLs Intravenous $New Bag 3/19/25 2224)   ketorolac (TORADOL) injection 30 mg (30 mg Intravenous $Given 3/19/25 2224)       Assessments & Plan (with Medical Decision Making)  Michela He is a 35 year old female here with cough, congestion, body aches, chills and SOB.  She has been sick a week and overall feels about the same.  Her son was sick a few weeks ago and negative for COVID, influenza and RSV. She tried Silvina [place] Cold and Flu which did not help.   VS in the ED on room air BP (!) 140/99   Pulse 120   Temp 100.8  F (38.2  C)  "(Oral)   Resp 18   Ht 1.702 m (5' 7\")   Wt 99.8 kg (220 lb)   LMP 03/16/2025   SpO2 96%   Breastfeeding No   BMI 34.46 kg/m    Exam shows decreased breath sounds on the left side.   We gave Toradol, IV bolus  Labs show CBC normal, BMP okay, pregnancy negative, 4 Plex negative.  Chest xray negative.  11:55 PM   I spoke with Michela about this. She is okay with going home.       I have reviewed the nursing notes.    I have reviewed the findings, diagnosis, plan and need for follow up with the patient.  Medical Decision Making  The patient's presentation was of moderate complexity (an acute illness with systemic symptoms).    The patient's evaluation involved:  an assessment requiring an independent historian (see separate area of note for details)  ordering and/or review of 3+ test(s) in this encounter (see separate area of note for details)    The patient's management necessitated moderate risk (prescription drug management including medications given in the ED).      Final diagnoses:   Viral URI with cough       3/19/2025   [facility] AND [facility], Bryant Edmond MD  03/19/25 4546    "

## 2025-03-20 NOTE — DISCHARGE INSTRUCTIONS
Thank you for choosing our Emergency Department for your care.     You may receive a phone call or letter for a survey about your care in our ED.  Please complete this as this is how we improve care for our patients.     If you have any questions after leaving the ED you can call or text me on my cell phone at [phone].  I am not on call so if I do not answer my phone please leave a message- I will get back to you.  If you are not doing well please return to the ED.     Sincerely,    Dr Anderson Monreal M.D.  Medical Director  [facility] Emergency Department

## 2025-05-17 ENCOUNTER — HEALTH MAINTENANCE LETTER (OUTPATIENT)
Age: 35
End: 2025-05-17

## (undated) DEVICE — TROCAR KII SLEEVE 5MM X 100MM 12/BX

## (undated) DEVICE — SYR 10ML LL W/O NDL

## (undated) DEVICE — SU DERMABOND ADVANCED .7ML DNX12

## (undated) DEVICE — ESU HOLDER LAP INST DISP PURPLE LONG 330MM H-PRO-330

## (undated) DEVICE — LIGHT HANDLES PLASTIC

## (undated) DEVICE — Device

## (undated) DEVICE — GLOVE PROTEXIS POWDER FREE SMT 7.5  2D72PT75X

## (undated) DEVICE — SOL WATER 1500ML

## (undated) DEVICE — PREP CHLORAPREP 26ML TINTED ORANGE  260815

## (undated) DEVICE — VERRES NEEDLE 120MM DISPOSABLE 12/BX

## (undated) DEVICE — ESU PENCIL W/SMOKE EVAC CVPLP2000

## (undated) DEVICE — PREP SKIN SCRUB TRAY 4461A

## (undated) DEVICE — DRAPE LAVH/LAPAROSCOPY W/POUCH 29474

## (undated) DEVICE — CATH SELF FEMALE 14FR 6" 214

## (undated) DEVICE — TUBING INSUFFLATOR W/FILTER OLYMPUS WA95005A

## (undated) DEVICE — ESU LIGASURE LAPAROSCOPIC BLUNT TIP SEALER 5MMX37CM LF1837

## (undated) DEVICE — PACK LAPAROSCOPY LF SBA15LPFCA

## (undated) DEVICE — SLEEVE COMPRESSION SCD KNEE MED 74022

## (undated) DEVICE — ENDO TROCAR FIRST ENTRY KII FIOS ADV FIX 05X100MM CFF03

## (undated) DEVICE — DECANTER VIAL 2006S

## (undated) DEVICE — SU MONOCRYL 3-0 PS-2 18" UND Y497G

## (undated) RX ORDER — CEFAZOLIN SODIUM 2 G/100ML
INJECTION, SOLUTION INTRAVENOUS
Status: DISPENSED
Start: 2019-01-07

## (undated) RX ORDER — HYDROCODONE BITARTRATE AND ACETAMINOPHEN 5; 325 MG/1; MG/1
TABLET ORAL
Status: DISPENSED
Start: 2019-01-07

## (undated) RX ORDER — SCOLOPAMINE TRANSDERMAL SYSTEM 1 MG/1
PATCH, EXTENDED RELEASE TRANSDERMAL
Status: DISPENSED
Start: 2019-01-07

## (undated) RX ORDER — HYDROMORPHONE HYDROCHLORIDE 1 MG/ML
INJECTION, SOLUTION INTRAMUSCULAR; INTRAVENOUS; SUBCUTANEOUS
Status: DISPENSED
Start: 2019-01-07

## (undated) RX ORDER — DEXAMETHASONE SODIUM PHOSPHATE 4 MG/ML
INJECTION, SOLUTION INTRA-ARTICULAR; INTRALESIONAL; INTRAMUSCULAR; INTRAVENOUS; SOFT TISSUE
Status: DISPENSED
Start: 2019-01-07

## (undated) RX ORDER — LIDOCAINE HYDROCHLORIDE 20 MG/ML
INJECTION, SOLUTION EPIDURAL; INFILTRATION; INTRACAUDAL; PERINEURAL
Status: DISPENSED
Start: 2019-01-07

## (undated) RX ORDER — IBUPROFEN 200 MG
TABLET ORAL
Status: DISPENSED
Start: 2019-01-07

## (undated) RX ORDER — PROPOFOL 10 MG/ML
INJECTION, EMULSION INTRAVENOUS
Status: DISPENSED
Start: 2019-01-07

## (undated) RX ORDER — KETOROLAC TROMETHAMINE 30 MG/ML
INJECTION, SOLUTION INTRAMUSCULAR; INTRAVENOUS
Status: DISPENSED
Start: 2025-03-19

## (undated) RX ORDER — SODIUM CHLORIDE 9 MG/ML
INJECTION, SOLUTION INTRAVENOUS
Status: DISPENSED
Start: 2018-04-20

## (undated) RX ORDER — ONDANSETRON 2 MG/ML
INJECTION INTRAMUSCULAR; INTRAVENOUS
Status: DISPENSED
Start: 2019-01-07

## (undated) RX ORDER — IBUPROFEN 200 MG
TABLET ORAL
Status: DISPENSED
Start: 2023-11-12

## (undated) RX ORDER — METRONIDAZOLE 500 MG/1
TABLET ORAL
Status: DISPENSED
Start: 2023-11-12

## (undated) RX ORDER — KETOROLAC TROMETHAMINE 30 MG/ML
INJECTION, SOLUTION INTRAMUSCULAR; INTRAVENOUS
Status: DISPENSED
Start: 2019-01-07

## (undated) RX ORDER — FENTANYL CITRATE 50 UG/ML
INJECTION, SOLUTION INTRAMUSCULAR; INTRAVENOUS
Status: DISPENSED
Start: 2019-01-07

## (undated) RX ORDER — BUPIVACAINE HYDROCHLORIDE 2.5 MG/ML
INJECTION, SOLUTION EPIDURAL; INFILTRATION; INTRACAUDAL
Status: DISPENSED
Start: 2019-01-07

## (undated) RX ORDER — SODIUM CHLORIDE 9 MG/ML
INJECTION, SOLUTION INTRAVENOUS
Status: DISPENSED
Start: 2018-04-21